# Patient Record
Sex: MALE | Race: ASIAN | NOT HISPANIC OR LATINO
[De-identification: names, ages, dates, MRNs, and addresses within clinical notes are randomized per-mention and may not be internally consistent; named-entity substitution may affect disease eponyms.]

---

## 2017-03-17 ENCOUNTER — APPOINTMENT (OUTPATIENT)
Dept: INTERNAL MEDICINE | Facility: CLINIC | Age: 32
End: 2017-03-17

## 2017-03-17 VITALS
WEIGHT: 202 LBS | SYSTOLIC BLOOD PRESSURE: 122 MMHG | HEIGHT: 70 IN | HEART RATE: 88 BPM | BODY MASS INDEX: 28.92 KG/M2 | TEMPERATURE: 98 F | OXYGEN SATURATION: 98 % | DIASTOLIC BLOOD PRESSURE: 78 MMHG

## 2017-03-17 DIAGNOSIS — G89.29 PAIN IN LEFT ANKLE AND JOINTS OF LEFT FOOT: ICD-10-CM

## 2017-03-17 DIAGNOSIS — Z83.49 FAMILY HISTORY OF OTHER ENDOCRINE, NUTRITIONAL AND METABOLIC DISEASES: ICD-10-CM

## 2017-03-17 DIAGNOSIS — Z82.49 FAMILY HISTORY OF ISCHEMIC HEART DISEASE AND OTHER DISEASES OF THE CIRCULATORY SYSTEM: ICD-10-CM

## 2017-03-17 DIAGNOSIS — M25.572 PAIN IN LEFT ANKLE AND JOINTS OF LEFT FOOT: ICD-10-CM

## 2017-03-20 DIAGNOSIS — E55.9 VITAMIN D DEFICIENCY, UNSPECIFIED: ICD-10-CM

## 2017-03-20 LAB
25(OH)D3 SERPL-MCNC: 9.9 NG/ML
ALBUMIN SERPL ELPH-MCNC: 4.7 G/DL
ALP BLD-CCNC: 70 U/L
ALT SERPL-CCNC: 38 U/L
ANION GAP SERPL CALC-SCNC: 16 MMOL/L
AST SERPL-CCNC: 23 U/L
BILIRUB SERPL-MCNC: 1.1 MG/DL
BUN SERPL-MCNC: 13 MG/DL
CALCIUM SERPL-MCNC: 9.8 MG/DL
CHLORIDE SERPL-SCNC: 102 MMOL/L
CHOLEST SERPL-MCNC: 199 MG/DL
CHOLEST/HDLC SERPL: 4.2 RATIO
CO2 SERPL-SCNC: 22 MMOL/L
CREAT SERPL-MCNC: 0.89 MG/DL
GLUCOSE SERPL-MCNC: 99 MG/DL
HBA1C MFR BLD HPLC: 5.5 %
HDLC SERPL-MCNC: 47 MG/DL
HIV1+2 AB SPEC QL IA.RAPID: NONREACTIVE
LDLC SERPL CALC-MCNC: 113 MG/DL
POTASSIUM SERPL-SCNC: 4.4 MMOL/L
PROT SERPL-MCNC: 7.9 G/DL
SODIUM SERPL-SCNC: 140 MMOL/L
TRIGL SERPL-MCNC: 193 MG/DL
TSH SERPL-ACNC: 0.77 UIU/ML

## 2017-09-19 ENCOUNTER — APPOINTMENT (OUTPATIENT)
Dept: INTERNAL MEDICINE | Facility: CLINIC | Age: 32
End: 2017-09-19
Payer: COMMERCIAL

## 2017-09-19 VITALS
HEART RATE: 88 BPM | TEMPERATURE: 98.3 F | BODY MASS INDEX: 28.06 KG/M2 | DIASTOLIC BLOOD PRESSURE: 74 MMHG | SYSTOLIC BLOOD PRESSURE: 120 MMHG | OXYGEN SATURATION: 98 % | WEIGHT: 196 LBS | HEIGHT: 70 IN

## 2017-09-19 PROCEDURE — 99213 OFFICE O/P EST LOW 20 MIN: CPT | Mod: 25

## 2017-09-19 PROCEDURE — 93000 ELECTROCARDIOGRAM COMPLETE: CPT

## 2017-09-20 LAB
25(OH)D3 SERPL-MCNC: 15.3 NG/ML
ALBUMIN SERPL ELPH-MCNC: 4.6 G/DL
ALP BLD-CCNC: 66 U/L
ALT SERPL-CCNC: 15 U/L
ANION GAP SERPL CALC-SCNC: 15 MMOL/L
AST SERPL-CCNC: 17 U/L
BASOPHILS # BLD AUTO: 0.03 K/UL
BASOPHILS NFR BLD AUTO: 0.5 %
BILIRUB SERPL-MCNC: 0.9 MG/DL
BUN SERPL-MCNC: 17 MG/DL
CALCIUM SERPL-MCNC: 9.3 MG/DL
CHLORIDE SERPL-SCNC: 102 MMOL/L
CO2 SERPL-SCNC: 22 MMOL/L
CREAT SERPL-MCNC: 1.43 MG/DL
EOSINOPHIL # BLD AUTO: 0.11 K/UL
EOSINOPHIL NFR BLD AUTO: 1.9 %
GLUCOSE SERPL-MCNC: 93 MG/DL
HCT VFR BLD CALC: 41.4 %
HGB BLD-MCNC: 13.9 G/DL
IMM GRANULOCYTES NFR BLD AUTO: 0.3 %
LYMPHOCYTES # BLD AUTO: 1.57 K/UL
LYMPHOCYTES NFR BLD AUTO: 26.9 %
MAN DIFF?: NORMAL
MCHC RBC-ENTMCNC: 27.5 PG
MCHC RBC-ENTMCNC: 33.6 GM/DL
MCV RBC AUTO: 82 FL
MONOCYTES # BLD AUTO: 0.39 K/UL
MONOCYTES NFR BLD AUTO: 6.7 %
NEUTROPHILS # BLD AUTO: 3.72 K/UL
NEUTROPHILS NFR BLD AUTO: 63.7 %
PLATELET # BLD AUTO: 240 K/UL
POTASSIUM SERPL-SCNC: 4.2 MMOL/L
PROT SERPL-MCNC: 7.4 G/DL
RBC # BLD: 5.05 M/UL
RBC # FLD: 13.1 %
SODIUM SERPL-SCNC: 139 MMOL/L
TSH SERPL-ACNC: 0.85 UIU/ML
WBC # FLD AUTO: 5.84 K/UL

## 2017-12-29 ENCOUNTER — CLINICAL ADVICE (OUTPATIENT)
Age: 32
End: 2017-12-29

## 2018-07-10 ENCOUNTER — APPOINTMENT (OUTPATIENT)
Dept: DERMATOLOGY | Facility: CLINIC | Age: 33
End: 2018-07-10
Payer: COMMERCIAL

## 2018-07-10 VITALS
WEIGHT: 190 LBS | BODY MASS INDEX: 26.6 KG/M2 | SYSTOLIC BLOOD PRESSURE: 122 MMHG | HEIGHT: 71 IN | DIASTOLIC BLOOD PRESSURE: 70 MMHG

## 2018-07-10 PROCEDURE — 99243 OFF/OP CNSLTJ NEW/EST LOW 30: CPT

## 2018-07-10 RX ORDER — CLOBETASOL PROPIONATE 0.5 MG/G
0.05 OINTMENT TOPICAL
Qty: 1 | Refills: 1 | Status: ACTIVE | COMMUNITY
Start: 2018-07-10 | End: 1900-01-01

## 2018-07-16 ENCOUNTER — APPOINTMENT (OUTPATIENT)
Dept: ORTHOPEDIC SURGERY | Facility: CLINIC | Age: 33
End: 2018-07-16

## 2018-07-31 ENCOUNTER — APPOINTMENT (OUTPATIENT)
Dept: CV DIAGNOSTICS | Facility: HOSPITAL | Age: 33
End: 2018-07-31

## 2018-08-10 ENCOUNTER — APPOINTMENT (OUTPATIENT)
Dept: DERMATOLOGY | Facility: CLINIC | Age: 33
End: 2018-08-10

## 2018-09-14 ENCOUNTER — APPOINTMENT (OUTPATIENT)
Dept: INTERNAL MEDICINE | Facility: CLINIC | Age: 33
End: 2018-09-14
Payer: COMMERCIAL

## 2018-09-14 VITALS
HEART RATE: 104 BPM | SYSTOLIC BLOOD PRESSURE: 110 MMHG | BODY MASS INDEX: 28.67 KG/M2 | HEIGHT: 69.69 IN | DIASTOLIC BLOOD PRESSURE: 60 MMHG | OXYGEN SATURATION: 98 % | TEMPERATURE: 99 F | WEIGHT: 198 LBS

## 2018-09-14 DIAGNOSIS — R94.31 ABNORMAL ELECTROCARDIOGRAM [ECG] [EKG]: ICD-10-CM

## 2018-09-14 PROCEDURE — 36415 COLL VENOUS BLD VENIPUNCTURE: CPT

## 2018-09-14 PROCEDURE — 99395 PREV VISIT EST AGE 18-39: CPT | Mod: 25

## 2018-09-14 NOTE — ASSESSMENT
[FreeTextEntry1] : 32yo M with pmh of anxiety now resolved - here for cpe.\par \par Hx of abn EKG - previously referred to cardiology - planning to go after probation time from work end\par \par HCM: cpe today\par          nonfasting labs\par          declined flu - can return to see nurse

## 2018-09-14 NOTE — HISTORY OF PRESENT ILLNESS
[FreeTextEntry1] : cpe [de-identified] : 34yo M here for cpe.\par PMH: anxiety\par Surgery: none\par Allergies: none\par Meds: Reviewed. xanax dc'ed -no longer needed\par \par ROS: neg. Remainder of ROS reviewed and found to be negative.\par \par PHYSICAL\par Gen: Adult, NAD\par Head: NC/AT\par EENT: ears grossly normal, PERRL, EOMI moist mucosa\par Neck: supple\par Chest wall: nontender\par CV: normal s1 +s2, rrr, no murmurs\par Pulm: CTA-B\par Abd: soft, NT, ND\par Skin: no rashes\par Back: no CVA tenderness, no spinal tenderness\par Neuro: gait normal, AAOx3\par Psych: normal affect, normal interaction

## 2018-09-25 LAB
25(OH)D3 SERPL-MCNC: 18.2 NG/ML
ALBUMIN SERPL ELPH-MCNC: 4.7 G/DL
ALP BLD-CCNC: 65 U/L
ALT SERPL-CCNC: 24 U/L
ANION GAP SERPL CALC-SCNC: 16 MMOL/L
AST SERPL-CCNC: 19 U/L
BASOPHILS # BLD AUTO: 0.03 K/UL
BASOPHILS NFR BLD AUTO: 0.5 %
BILIRUB SERPL-MCNC: 0.9 MG/DL
BUN SERPL-MCNC: 13 MG/DL
CALCIUM SERPL-MCNC: 9.7 MG/DL
CHLORIDE SERPL-SCNC: 107 MMOL/L
CHOLEST SERPL-MCNC: 184 MG/DL
CHOLEST/HDLC SERPL: 4.4 RATIO
CO2 SERPL-SCNC: 20 MMOL/L
CREAT SERPL-MCNC: 1.08 MG/DL
DEPRECATED D DIMER PPP IA-ACNC: <150 NG/ML DDU
EOSINOPHIL # BLD AUTO: 0.1 K/UL
EOSINOPHIL NFR BLD AUTO: 1.6 %
GLUCOSE SERPL-MCNC: 105 MG/DL
HBA1C MFR BLD HPLC: 5.4 %
HCT VFR BLD CALC: 43.5 %
HDLC SERPL-MCNC: 42 MG/DL
HGB BLD-MCNC: 14.9 G/DL
IMM GRANULOCYTES NFR BLD AUTO: 0.5 %
LDLC SERPL CALC-MCNC: 99 MG/DL
LYMPHOCYTES # BLD AUTO: 1.39 K/UL
LYMPHOCYTES NFR BLD AUTO: 22.2 %
MAN DIFF?: NORMAL
MCHC RBC-ENTMCNC: 28 PG
MCHC RBC-ENTMCNC: 34.3 GM/DL
MCV RBC AUTO: 81.8 FL
MONOCYTES # BLD AUTO: 0.29 K/UL
MONOCYTES NFR BLD AUTO: 4.6 %
NEUTROPHILS # BLD AUTO: 4.42 K/UL
NEUTROPHILS NFR BLD AUTO: 70.6 %
PLATELET # BLD AUTO: 261 K/UL
POTASSIUM SERPL-SCNC: 4.5 MMOL/L
PROT SERPL-MCNC: 7.7 G/DL
RBC # BLD: 5.32 M/UL
RBC # FLD: 13 %
SODIUM SERPL-SCNC: 143 MMOL/L
TRIGL SERPL-MCNC: 214 MG/DL
TSH SERPL-ACNC: 1.1 UIU/ML
WBC # FLD AUTO: 6.26 K/UL

## 2018-11-30 ENCOUNTER — MEDICATION RENEWAL (OUTPATIENT)
Age: 33
End: 2018-11-30

## 2019-03-11 ENCOUNTER — MEDICATION RENEWAL (OUTPATIENT)
Age: 34
End: 2019-03-11

## 2019-03-25 ENCOUNTER — MEDICATION RENEWAL (OUTPATIENT)
Age: 34
End: 2019-03-25

## 2019-05-17 ENCOUNTER — MEDICATION RENEWAL (OUTPATIENT)
Age: 34
End: 2019-05-17

## 2019-07-29 ENCOUNTER — MEDICATION RENEWAL (OUTPATIENT)
Age: 34
End: 2019-07-29

## 2019-10-14 ENCOUNTER — MEDICATION RENEWAL (OUTPATIENT)
Age: 34
End: 2019-10-14

## 2019-12-06 ENCOUNTER — APPOINTMENT (OUTPATIENT)
Dept: INTERNAL MEDICINE | Facility: CLINIC | Age: 34
End: 2019-12-06
Payer: COMMERCIAL

## 2019-12-06 VITALS
OXYGEN SATURATION: 98 % | HEIGHT: 69 IN | BODY MASS INDEX: 28.58 KG/M2 | WEIGHT: 193 LBS | TEMPERATURE: 97.8 F | DIASTOLIC BLOOD PRESSURE: 80 MMHG | SYSTOLIC BLOOD PRESSURE: 130 MMHG | HEART RATE: 88 BPM

## 2019-12-06 PROCEDURE — 99395 PREV VISIT EST AGE 18-39: CPT | Mod: 25

## 2019-12-06 PROCEDURE — 36415 COLL VENOUS BLD VENIPUNCTURE: CPT

## 2019-12-06 RX ORDER — KETOCONAZOLE 20.5 MG/ML
2 SHAMPOO, SUSPENSION TOPICAL
Qty: 1 | Refills: 2 | Status: ACTIVE | COMMUNITY
Start: 2017-03-17 | End: 1900-01-01

## 2019-12-09 LAB
25(OH)D3 SERPL-MCNC: 18.4 NG/ML
ALBUMIN SERPL ELPH-MCNC: 4.9 G/DL
ALP BLD-CCNC: 64 U/L
ALT SERPL-CCNC: 22 U/L
ANION GAP SERPL CALC-SCNC: 15 MMOL/L
AST SERPL-CCNC: 16 U/L
BASOPHILS # BLD AUTO: 0.05 K/UL
BASOPHILS NFR BLD AUTO: 0.8 %
BILIRUB SERPL-MCNC: 1.2 MG/DL
BUN SERPL-MCNC: 24 MG/DL
C TRACH RRNA SPEC QL NAA+PROBE: NOT DETECTED
CALCIUM SERPL-MCNC: 9.3 MG/DL
CHLORIDE SERPL-SCNC: 106 MMOL/L
CHOLEST SERPL-MCNC: 182 MG/DL
CHOLEST/HDLC SERPL: 3.8 RATIO
CO2 SERPL-SCNC: 20 MMOL/L
CREAT SERPL-MCNC: 1.1 MG/DL
EOSINOPHIL # BLD AUTO: 0.07 K/UL
EOSINOPHIL NFR BLD AUTO: 1.1 %
ESTIMATED AVERAGE GLUCOSE: 108 MG/DL
GLUCOSE SERPL-MCNC: 96 MG/DL
HBA1C MFR BLD HPLC: 5.4 %
HCT VFR BLD CALC: 45.8 %
HDLC SERPL-MCNC: 48 MG/DL
HGB BLD-MCNC: 15.2 G/DL
HIV1+2 AB SPEC QL IA.RAPID: NONREACTIVE
IMM GRANULOCYTES NFR BLD AUTO: 0.3 %
LDLC SERPL CALC-MCNC: 120 MG/DL
LYMPHOCYTES # BLD AUTO: 1.58 K/UL
LYMPHOCYTES NFR BLD AUTO: 24.7 %
MAN DIFF?: NORMAL
MCHC RBC-ENTMCNC: 28 PG
MCHC RBC-ENTMCNC: 33.2 GM/DL
MCV RBC AUTO: 84.5 FL
MONOCYTES # BLD AUTO: 0.5 K/UL
MONOCYTES NFR BLD AUTO: 7.8 %
N GONORRHOEA RRNA SPEC QL NAA+PROBE: NOT DETECTED
NEUTROPHILS # BLD AUTO: 4.18 K/UL
NEUTROPHILS NFR BLD AUTO: 65.3 %
PLATELET # BLD AUTO: 217 K/UL
POTASSIUM SERPL-SCNC: 4.3 MMOL/L
PROT SERPL-MCNC: 7.2 G/DL
RBC # BLD: 5.42 M/UL
RBC # FLD: 12.6 %
SODIUM SERPL-SCNC: 141 MMOL/L
SOURCE AMPLIFICATION: NORMAL
T PALLIDUM AB SER QL IA: NEGATIVE
TRIGL SERPL-MCNC: 68 MG/DL
TSH SERPL-ACNC: 0.92 UIU/ML
WBC # FLD AUTO: 6.4 K/UL

## 2019-12-09 NOTE — ASSESSMENT
[FreeTextEntry1] : 35yo M with psoriasis here for cpe\par \par psoriasis - sees derm, needs refill of shampoo\par \par hcm - defer flu to another time\par            labs today\par             has a  skin doctor\par              sees dentist\par               std check

## 2019-12-09 NOTE — HISTORY OF PRESENT ILLNESS
[FreeTextEntry1] : cpe [de-identified] : 33yo M here for cpe\par no ER but went to  for a fever while in [place] - \par dx with flu - took abx\par on-off cough \par no allergies - seasonal \par occasional sinus \par \par going through a divorce\par stressor at work - looking to change\par does weights - goes to gym 3 times per week\par \par Remainder of ROS reviewed and found to be negative.\par

## 2021-06-30 ENCOUNTER — APPOINTMENT (OUTPATIENT)
Dept: DERMATOLOGY | Facility: CLINIC | Age: 36
End: 2021-06-30
Payer: COMMERCIAL

## 2021-06-30 VITALS — HEIGHT: 72 IN | BODY MASS INDEX: 26.41 KG/M2 | WEIGHT: 195 LBS

## 2021-06-30 DIAGNOSIS — L21.9 SEBORRHEIC DERMATITIS, UNSPECIFIED: ICD-10-CM

## 2021-06-30 DIAGNOSIS — L81.8 OTHER SPECIFIED DISORDERS OF PIGMENTATION: ICD-10-CM

## 2021-06-30 PROCEDURE — 99072 ADDL SUPL MATRL&STAF TM PHE: CPT

## 2021-06-30 PROCEDURE — 99214 OFFICE O/P EST MOD 30 MIN: CPT

## 2021-06-30 RX ORDER — HYDROCORTISONE 25 MG/G
2.5 CREAM TOPICAL
Qty: 1 | Refills: 2 | Status: ACTIVE | COMMUNITY
Start: 2018-07-10 | End: 1900-01-01

## 2021-06-30 RX ORDER — CICLOPIROX 10 MG/.96ML
1 SHAMPOO TOPICAL
Qty: 1 | Refills: 11 | Status: ACTIVE | COMMUNITY
Start: 2021-06-30 | End: 1900-01-01

## 2021-07-01 PROBLEM — L21.9 SEBORRHEIC DERMATITIS: Status: ACTIVE | Noted: 2018-07-10

## 2021-07-01 PROBLEM — L81.8 POSTINFLAMMATORY HYPOPIGMENTATION: Status: ACTIVE | Noted: 2021-07-01

## 2021-07-08 ENCOUNTER — TRANSCRIPTION ENCOUNTER (OUTPATIENT)
Age: 36
End: 2021-07-08

## 2021-07-30 ENCOUNTER — APPOINTMENT (OUTPATIENT)
Dept: INTERNAL MEDICINE | Facility: CLINIC | Age: 36
End: 2021-07-30
Payer: COMMERCIAL

## 2021-07-30 VITALS
DIASTOLIC BLOOD PRESSURE: 68 MMHG | OXYGEN SATURATION: 98 % | TEMPERATURE: 98 F | WEIGHT: 194 LBS | HEART RATE: 89 BPM | BODY MASS INDEX: 26.31 KG/M2 | SYSTOLIC BLOOD PRESSURE: 110 MMHG

## 2021-07-30 DIAGNOSIS — Z00.00 ENCOUNTER FOR GENERAL ADULT MEDICAL EXAMINATION W/OUT ABNORMAL FINDINGS: ICD-10-CM

## 2021-07-30 DIAGNOSIS — Z86.59 PERSONAL HISTORY OF OTHER MENTAL AND BEHAVIORAL DISORDERS: ICD-10-CM

## 2021-07-30 DIAGNOSIS — Z87.898 PERSONAL HISTORY OF OTHER SPECIFIED CONDITIONS: ICD-10-CM

## 2021-07-30 DIAGNOSIS — L30.1 DYSHIDROSIS [POMPHOLYX]: ICD-10-CM

## 2021-07-30 DIAGNOSIS — R79.89 OTHER SPECIFIED ABNORMAL FINDINGS OF BLOOD CHEMISTRY: ICD-10-CM

## 2021-07-30 PROCEDURE — 99395 PREV VISIT EST AGE 18-39: CPT

## 2021-07-30 RX ORDER — ALPRAZOLAM 0.25 MG/1
0.25 TABLET ORAL
Qty: 15 | Refills: 0 | Status: DISCONTINUED | COMMUNITY
Start: 2017-09-19 | End: 2021-07-30

## 2021-07-30 NOTE — HEALTH RISK ASSESSMENT
[Good] : ~his/her~  mood as  good [No] : In the past 12 months have you used drugs other than those required for medical reasons? No [0] : 2) Feeling down, depressed, or hopeless: Not at all (0) [PHQ-2 Negative - No further assessment needed] : PHQ-2 Negative - No further assessment needed [Alone] : lives alone [Employed] : employed [Smoke Detector] : smoke detector [Carbon Monoxide Detector] : carbon monoxide detector [] : No [de-identified] : about twice a week - weights and basketball  [[id number]] : 0 [Sexually Active] : not sexually active [Reports changes in hearing] : Reports no changes in hearing [Reports changes in vision] : Reports no changes in vision [Reports changes in dental health] : Reports no changes in dental health [Guns at Home] : no guns at home

## 2021-07-30 NOTE — ASSESSMENT
[FreeTextEntry1] : HM\par UTD dental\par referral for skin screening\par tdap advised, would like to wait until next visit \par check labs\par Recieved COVID vaccine \par

## 2021-07-30 NOTE — PHYSICAL EXAM
[No Carotid Bruits] : no carotid bruits [No Edema] : there was no peripheral edema [Coordination Grossly Intact] : coordination grossly intact [No Focal Deficits] : no focal deficits [Normal Gait] : normal gait [Normal] : affect was normal and insight and judgment were intact

## 2021-07-30 NOTE — HISTORY OF PRESENT ILLNESS
[FreeTextEntry1] : CPE/wellness visit  [de-identified] : 36 y.o. male, PMHx panic attacks, palpitations, eczema, elevated creatinine.  \par \par Feeling well, no concerns offered.  Getting  next month, just bought a house.  \par \par No issue with panic attacks, anxiety, palpitations in several years.  All work related, has a new job.  \par \par Regular follow up with derm for eczema.  \par \par H/o elevated creatinine,  repeats have been normal.

## 2021-08-02 LAB
25(OH)D3 SERPL-MCNC: 17.6 NG/ML
ALBUMIN SERPL ELPH-MCNC: 4.8 G/DL
ALP BLD-CCNC: 67 U/L
ALT SERPL-CCNC: 20 U/L
ANION GAP SERPL CALC-SCNC: 12 MMOL/L
AST SERPL-CCNC: 13 U/L
BASOPHILS # BLD AUTO: 0.04 K/UL
BASOPHILS NFR BLD AUTO: 0.8 %
BILIRUB SERPL-MCNC: 1.2 MG/DL
BUN SERPL-MCNC: 13 MG/DL
C TRACH RRNA SPEC QL NAA+PROBE: NOT DETECTED
CALCIUM SERPL-MCNC: 9.5 MG/DL
CHLORIDE SERPL-SCNC: 104 MMOL/L
CHOLEST SERPL-MCNC: 189 MG/DL
CO2 SERPL-SCNC: 22 MMOL/L
CREAT SERPL-MCNC: 0.94 MG/DL
EOSINOPHIL # BLD AUTO: 0.11 K/UL
EOSINOPHIL NFR BLD AUTO: 2.2 %
ESTIMATED AVERAGE GLUCOSE: 108 MG/DL
FOLATE SERPL-MCNC: 18.1 NG/ML
GLUCOSE SERPL-MCNC: 101 MG/DL
HBA1C MFR BLD HPLC: 5.4 %
HCT VFR BLD CALC: 44.2 %
HCV AB SER QL: NONREACTIVE
HCV S/CO RATIO: 0.14 S/CO
HDLC SERPL-MCNC: 42 MG/DL
HGB BLD-MCNC: 14.9 G/DL
HIV1+2 AB SPEC QL IA.RAPID: NONREACTIVE
IMM GRANULOCYTES NFR BLD AUTO: 0.4 %
LDLC SERPL CALC-MCNC: 116 MG/DL
LYMPHOCYTES # BLD AUTO: 1.4 K/UL
LYMPHOCYTES NFR BLD AUTO: 28.6 %
MAN DIFF?: NORMAL
MCHC RBC-ENTMCNC: 28 PG
MCHC RBC-ENTMCNC: 33.7 GM/DL
MCV RBC AUTO: 82.9 FL
MONOCYTES # BLD AUTO: 0.32 K/UL
MONOCYTES NFR BLD AUTO: 6.5 %
N GONORRHOEA RRNA SPEC QL NAA+PROBE: NOT DETECTED
NEUTROPHILS # BLD AUTO: 3.01 K/UL
NEUTROPHILS NFR BLD AUTO: 61.5 %
NONHDLC SERPL-MCNC: 147 MG/DL
PLATELET # BLD AUTO: 243 K/UL
POTASSIUM SERPL-SCNC: 3.9 MMOL/L
PROT SERPL-MCNC: 7.4 G/DL
RBC # BLD: 5.33 M/UL
RBC # FLD: 12.6 %
SODIUM SERPL-SCNC: 138 MMOL/L
SOURCE AMPLIFICATION: NORMAL
T PALLIDUM AB SER QL IA: NEGATIVE
TRIGL SERPL-MCNC: 154 MG/DL
TSH SERPL-ACNC: 1.09 UIU/ML
VIT B12 SERPL-MCNC: 542 PG/ML
WBC # FLD AUTO: 4.9 K/UL

## 2021-08-02 RX ORDER — ERGOCALCIFEROL 1.25 MG/1
1.25 MG CAPSULE, LIQUID FILLED ORAL
Qty: 12 | Refills: 0 | Status: ACTIVE | COMMUNITY
Start: 2017-03-20 | End: 1900-01-01

## 2022-11-21 NOTE — PHYSICAL EXAM
Detail Level: Detailed [de-identified] : Gen: Adult M, NAD\par Head: NC/AT\par EENT: ears grossly normal, PERRL, EOMI, moist mucosa\par Neck: supple\par Chest wall: nontender\par CV: normal s1 +s2, rrr, no murmurs\par Pulm: CTA-B\par Abd: soft, NT, ND\par Skin: no rashes\par Back: no CVA tenderness, no spinal tenderness\par Neuro: gait normal, AAOx3\par Psych: normal affect, normal interaction\par  [de-identified] : peeling skin in ear canal; erythema on lower face c/w psoriasis

## 2023-11-17 ENCOUNTER — INPATIENT (INPATIENT)
Facility: HOSPITAL | Age: 38
LOS: 4 days | Discharge: HOME | End: 2023-11-22
Attending: STUDENT IN AN ORGANIZED HEALTH CARE EDUCATION/TRAINING PROGRAM | Admitting: STUDENT IN AN ORGANIZED HEALTH CARE EDUCATION/TRAINING PROGRAM
Payer: COMMERCIAL

## 2023-11-17 VITALS
TEMPERATURE: 99 F | RESPIRATION RATE: 18 BRPM | OXYGEN SATURATION: 98 % | SYSTOLIC BLOOD PRESSURE: 128 MMHG | DIASTOLIC BLOOD PRESSURE: 84 MMHG | HEART RATE: 85 BPM

## 2023-11-17 PROCEDURE — 99285 EMERGENCY DEPT VISIT HI MDM: CPT

## 2023-11-17 NOTE — ED ADULT TRIAGE NOTE - CHIEF COMPLAINT QUOTE
Pt st" I been having discomfort like acid reflux...went to my Gi md did blood work told to go to ER for further eval may have a CBD blockage...have not been able to sleep for past 5 days. ." Pt st" I been having discomfort like acid reflux...went to my Gi md did blood work told to go to ER for further eval may have a CBD blockage...have not been able to sleep for past 5 days. ." pt localizing pain to epigastric area

## 2023-11-18 ENCOUNTER — TRANSCRIPTION ENCOUNTER (OUTPATIENT)
Age: 38
End: 2023-11-18

## 2023-11-18 DIAGNOSIS — K85.10 BILIARY ACUTE PANCREATITIS WITHOUT NECROSIS OR INFECTION: ICD-10-CM

## 2023-11-18 LAB
ALBUMIN SERPL ELPH-MCNC: 4.4 G/DL — SIGNIFICANT CHANGE UP (ref 3.3–5)
ALBUMIN SERPL ELPH-MCNC: 4.4 G/DL — SIGNIFICANT CHANGE UP (ref 3.3–5)
ALP SERPL-CCNC: 206 U/L — HIGH (ref 40–120)
ALP SERPL-CCNC: 206 U/L — HIGH (ref 40–120)
ALT FLD-CCNC: 754 U/L — HIGH (ref 4–41)
ALT FLD-CCNC: 754 U/L — HIGH (ref 4–41)
ANION GAP SERPL CALC-SCNC: 11 MMOL/L — SIGNIFICANT CHANGE UP (ref 7–14)
ANION GAP SERPL CALC-SCNC: 11 MMOL/L — SIGNIFICANT CHANGE UP (ref 7–14)
APTT BLD: 30.7 SEC — SIGNIFICANT CHANGE UP (ref 24.5–35.6)
APTT BLD: 30.7 SEC — SIGNIFICANT CHANGE UP (ref 24.5–35.6)
AST SERPL-CCNC: 228 U/L — HIGH (ref 4–40)
AST SERPL-CCNC: 228 U/L — HIGH (ref 4–40)
BASOPHILS # BLD AUTO: 0.04 K/UL — SIGNIFICANT CHANGE UP (ref 0–0.2)
BASOPHILS # BLD AUTO: 0.04 K/UL — SIGNIFICANT CHANGE UP (ref 0–0.2)
BASOPHILS NFR BLD AUTO: 0.5 % — SIGNIFICANT CHANGE UP (ref 0–2)
BASOPHILS NFR BLD AUTO: 0.5 % — SIGNIFICANT CHANGE UP (ref 0–2)
BILIRUB SERPL-MCNC: 7.4 MG/DL — HIGH (ref 0.2–1.2)
BILIRUB SERPL-MCNC: 7.4 MG/DL — HIGH (ref 0.2–1.2)
BLD GP AB SCN SERPL QL: NEGATIVE — SIGNIFICANT CHANGE UP
BLD GP AB SCN SERPL QL: NEGATIVE — SIGNIFICANT CHANGE UP
BUN SERPL-MCNC: 8 MG/DL — SIGNIFICANT CHANGE UP (ref 7–23)
BUN SERPL-MCNC: 8 MG/DL — SIGNIFICANT CHANGE UP (ref 7–23)
CALCIUM SERPL-MCNC: 8.9 MG/DL — SIGNIFICANT CHANGE UP (ref 8.4–10.5)
CALCIUM SERPL-MCNC: 8.9 MG/DL — SIGNIFICANT CHANGE UP (ref 8.4–10.5)
CHLORIDE SERPL-SCNC: 102 MMOL/L — SIGNIFICANT CHANGE UP (ref 98–107)
CHLORIDE SERPL-SCNC: 102 MMOL/L — SIGNIFICANT CHANGE UP (ref 98–107)
CO2 SERPL-SCNC: 24 MMOL/L — SIGNIFICANT CHANGE UP (ref 22–31)
CO2 SERPL-SCNC: 24 MMOL/L — SIGNIFICANT CHANGE UP (ref 22–31)
CREAT SERPL-MCNC: 0.98 MG/DL — SIGNIFICANT CHANGE UP (ref 0.5–1.3)
CREAT SERPL-MCNC: 0.98 MG/DL — SIGNIFICANT CHANGE UP (ref 0.5–1.3)
EGFR: 100 ML/MIN/1.73M2 — SIGNIFICANT CHANGE UP
EGFR: 100 ML/MIN/1.73M2 — SIGNIFICANT CHANGE UP
EOSINOPHIL # BLD AUTO: 0.05 K/UL — SIGNIFICANT CHANGE UP (ref 0–0.5)
EOSINOPHIL # BLD AUTO: 0.05 K/UL — SIGNIFICANT CHANGE UP (ref 0–0.5)
EOSINOPHIL NFR BLD AUTO: 0.6 % — SIGNIFICANT CHANGE UP (ref 0–6)
EOSINOPHIL NFR BLD AUTO: 0.6 % — SIGNIFICANT CHANGE UP (ref 0–6)
GLUCOSE SERPL-MCNC: 125 MG/DL — HIGH (ref 70–99)
GLUCOSE SERPL-MCNC: 125 MG/DL — HIGH (ref 70–99)
HCT VFR BLD CALC: 44.5 % — SIGNIFICANT CHANGE UP (ref 39–50)
HCT VFR BLD CALC: 44.5 % — SIGNIFICANT CHANGE UP (ref 39–50)
HGB BLD-MCNC: 15.1 G/DL — SIGNIFICANT CHANGE UP (ref 13–17)
HGB BLD-MCNC: 15.1 G/DL — SIGNIFICANT CHANGE UP (ref 13–17)
IANC: 6.59 K/UL — SIGNIFICANT CHANGE UP (ref 1.8–7.4)
IANC: 6.59 K/UL — SIGNIFICANT CHANGE UP (ref 1.8–7.4)
IMM GRANULOCYTES NFR BLD AUTO: 0.7 % — SIGNIFICANT CHANGE UP (ref 0–0.9)
IMM GRANULOCYTES NFR BLD AUTO: 0.7 % — SIGNIFICANT CHANGE UP (ref 0–0.9)
INR BLD: 1.01 RATIO — SIGNIFICANT CHANGE UP (ref 0.85–1.18)
INR BLD: 1.01 RATIO — SIGNIFICANT CHANGE UP (ref 0.85–1.18)
LIDOCAIN IGE QN: > 3000 U/L — SIGNIFICANT CHANGE UP (ref 7–60)
LIDOCAIN IGE QN: > 3000 U/L — SIGNIFICANT CHANGE UP (ref 7–60)
LYMPHOCYTES # BLD AUTO: 0.89 K/UL — LOW (ref 1–3.3)
LYMPHOCYTES # BLD AUTO: 0.89 K/UL — LOW (ref 1–3.3)
LYMPHOCYTES # BLD AUTO: 10.7 % — LOW (ref 13–44)
LYMPHOCYTES # BLD AUTO: 10.7 % — LOW (ref 13–44)
MAGNESIUM SERPL-MCNC: 2.4 MG/DL — SIGNIFICANT CHANGE UP (ref 1.6–2.6)
MAGNESIUM SERPL-MCNC: 2.4 MG/DL — SIGNIFICANT CHANGE UP (ref 1.6–2.6)
MCHC RBC-ENTMCNC: 28.1 PG — SIGNIFICANT CHANGE UP (ref 27–34)
MCHC RBC-ENTMCNC: 28.1 PG — SIGNIFICANT CHANGE UP (ref 27–34)
MCHC RBC-ENTMCNC: 33.9 GM/DL — SIGNIFICANT CHANGE UP (ref 32–36)
MCHC RBC-ENTMCNC: 33.9 GM/DL — SIGNIFICANT CHANGE UP (ref 32–36)
MCV RBC AUTO: 82.9 FL — SIGNIFICANT CHANGE UP (ref 80–100)
MCV RBC AUTO: 82.9 FL — SIGNIFICANT CHANGE UP (ref 80–100)
MONOCYTES # BLD AUTO: 0.67 K/UL — SIGNIFICANT CHANGE UP (ref 0–0.9)
MONOCYTES # BLD AUTO: 0.67 K/UL — SIGNIFICANT CHANGE UP (ref 0–0.9)
MONOCYTES NFR BLD AUTO: 8.1 % — SIGNIFICANT CHANGE UP (ref 2–14)
MONOCYTES NFR BLD AUTO: 8.1 % — SIGNIFICANT CHANGE UP (ref 2–14)
NEUTROPHILS # BLD AUTO: 6.59 K/UL — SIGNIFICANT CHANGE UP (ref 1.8–7.4)
NEUTROPHILS # BLD AUTO: 6.59 K/UL — SIGNIFICANT CHANGE UP (ref 1.8–7.4)
NEUTROPHILS NFR BLD AUTO: 79.4 % — HIGH (ref 43–77)
NEUTROPHILS NFR BLD AUTO: 79.4 % — HIGH (ref 43–77)
NRBC # BLD: 0 /100 WBCS — SIGNIFICANT CHANGE UP (ref 0–0)
NRBC # BLD: 0 /100 WBCS — SIGNIFICANT CHANGE UP (ref 0–0)
NRBC # FLD: 0 K/UL — SIGNIFICANT CHANGE UP (ref 0–0)
NRBC # FLD: 0 K/UL — SIGNIFICANT CHANGE UP (ref 0–0)
PLATELET # BLD AUTO: 268 K/UL — SIGNIFICANT CHANGE UP (ref 150–400)
PLATELET # BLD AUTO: 268 K/UL — SIGNIFICANT CHANGE UP (ref 150–400)
POTASSIUM SERPL-MCNC: 3.6 MMOL/L — SIGNIFICANT CHANGE UP (ref 3.5–5.3)
POTASSIUM SERPL-MCNC: 3.6 MMOL/L — SIGNIFICANT CHANGE UP (ref 3.5–5.3)
POTASSIUM SERPL-SCNC: 3.6 MMOL/L — SIGNIFICANT CHANGE UP (ref 3.5–5.3)
POTASSIUM SERPL-SCNC: 3.6 MMOL/L — SIGNIFICANT CHANGE UP (ref 3.5–5.3)
PROT SERPL-MCNC: 7.3 G/DL — SIGNIFICANT CHANGE UP (ref 6–8.3)
PROT SERPL-MCNC: 7.3 G/DL — SIGNIFICANT CHANGE UP (ref 6–8.3)
PROTHROM AB SERPL-ACNC: 11.3 SEC — SIGNIFICANT CHANGE UP (ref 9.5–13)
PROTHROM AB SERPL-ACNC: 11.3 SEC — SIGNIFICANT CHANGE UP (ref 9.5–13)
RBC # BLD: 5.37 M/UL — SIGNIFICANT CHANGE UP (ref 4.2–5.8)
RBC # BLD: 5.37 M/UL — SIGNIFICANT CHANGE UP (ref 4.2–5.8)
RBC # FLD: 13.2 % — SIGNIFICANT CHANGE UP (ref 10.3–14.5)
RBC # FLD: 13.2 % — SIGNIFICANT CHANGE UP (ref 10.3–14.5)
RH IG SCN BLD-IMP: POSITIVE — SIGNIFICANT CHANGE UP
RH IG SCN BLD-IMP: POSITIVE — SIGNIFICANT CHANGE UP
SODIUM SERPL-SCNC: 137 MMOL/L — SIGNIFICANT CHANGE UP (ref 135–145)
SODIUM SERPL-SCNC: 137 MMOL/L — SIGNIFICANT CHANGE UP (ref 135–145)
WBC # BLD: 8.3 K/UL — SIGNIFICANT CHANGE UP (ref 3.8–10.5)
WBC # BLD: 8.3 K/UL — SIGNIFICANT CHANGE UP (ref 3.8–10.5)
WBC # FLD AUTO: 8.3 K/UL — SIGNIFICANT CHANGE UP (ref 3.8–10.5)
WBC # FLD AUTO: 8.3 K/UL — SIGNIFICANT CHANGE UP (ref 3.8–10.5)

## 2023-11-18 PROCEDURE — 74177 CT ABD & PELVIS W/CONTRAST: CPT | Mod: 26,MA

## 2023-11-18 PROCEDURE — 99222 1ST HOSP IP/OBS MODERATE 55: CPT | Mod: 57,GC

## 2023-11-18 PROCEDURE — 76705 ECHO EXAM OF ABDOMEN: CPT | Mod: 26

## 2023-11-18 PROCEDURE — 99285 EMERGENCY DEPT VISIT HI MDM: CPT | Mod: 25

## 2023-11-18 RX ORDER — PIPERACILLIN AND TAZOBACTAM 4; .5 G/20ML; G/20ML
3.38 INJECTION, POWDER, LYOPHILIZED, FOR SOLUTION INTRAVENOUS EVERY 8 HOURS
Refills: 0 | Status: DISCONTINUED | OUTPATIENT
Start: 2023-11-19 | End: 2023-11-20

## 2023-11-18 RX ORDER — HYDROMORPHONE HYDROCHLORIDE 2 MG/ML
0.5 INJECTION INTRAMUSCULAR; INTRAVENOUS; SUBCUTANEOUS EVERY 4 HOURS
Refills: 0 | Status: DISCONTINUED | OUTPATIENT
Start: 2023-11-18 | End: 2023-11-20

## 2023-11-18 RX ORDER — ONDANSETRON 8 MG/1
4 TABLET, FILM COATED ORAL ONCE
Refills: 0 | Status: COMPLETED | OUTPATIENT
Start: 2023-11-18 | End: 2023-11-18

## 2023-11-18 RX ORDER — PIPERACILLIN AND TAZOBACTAM 4; .5 G/20ML; G/20ML
3.38 INJECTION, POWDER, LYOPHILIZED, FOR SOLUTION INTRAVENOUS ONCE
Refills: 0 | Status: COMPLETED | OUTPATIENT
Start: 2023-11-18 | End: 2023-11-18

## 2023-11-18 RX ORDER — ACETAMINOPHEN 500 MG
1000 TABLET ORAL ONCE
Refills: 0 | Status: COMPLETED | OUTPATIENT
Start: 2023-11-18 | End: 2023-11-18

## 2023-11-18 RX ORDER — SODIUM CHLORIDE 9 MG/ML
1000 INJECTION INTRAMUSCULAR; INTRAVENOUS; SUBCUTANEOUS ONCE
Refills: 0 | Status: COMPLETED | OUTPATIENT
Start: 2023-11-18 | End: 2023-11-18

## 2023-11-18 RX ORDER — SODIUM CHLORIDE 9 MG/ML
1000 INJECTION INTRAMUSCULAR; INTRAVENOUS; SUBCUTANEOUS
Refills: 0 | Status: DISCONTINUED | OUTPATIENT
Start: 2023-11-18 | End: 2023-11-18

## 2023-11-18 RX ORDER — SODIUM CHLORIDE 9 MG/ML
1000 INJECTION, SOLUTION INTRAVENOUS
Refills: 0 | Status: DISCONTINUED | OUTPATIENT
Start: 2023-11-18 | End: 2023-11-20

## 2023-11-18 RX ORDER — ACETAMINOPHEN 500 MG
1000 TABLET ORAL EVERY 6 HOURS
Refills: 0 | Status: COMPLETED | OUTPATIENT
Start: 2023-11-18 | End: 2023-11-19

## 2023-11-18 RX ORDER — ENOXAPARIN SODIUM 100 MG/ML
40 INJECTION SUBCUTANEOUS EVERY 24 HOURS
Refills: 0 | Status: DISCONTINUED | OUTPATIENT
Start: 2023-11-18 | End: 2023-11-22

## 2023-11-18 RX ORDER — HYDROMORPHONE HYDROCHLORIDE 2 MG/ML
0.2 INJECTION INTRAMUSCULAR; INTRAVENOUS; SUBCUTANEOUS EVERY 4 HOURS
Refills: 0 | Status: DISCONTINUED | OUTPATIENT
Start: 2023-11-18 | End: 2023-11-20

## 2023-11-18 RX ORDER — PIPERACILLIN AND TAZOBACTAM 4; .5 G/20ML; G/20ML
3.38 INJECTION, POWDER, LYOPHILIZED, FOR SOLUTION INTRAVENOUS ONCE
Refills: 0 | Status: COMPLETED | OUTPATIENT
Start: 2023-11-19 | End: 2023-11-19

## 2023-11-18 RX ADMIN — SODIUM CHLORIDE 100 MILLILITER(S): 9 INJECTION, SOLUTION INTRAVENOUS at 10:19

## 2023-11-18 RX ADMIN — PIPERACILLIN AND TAZOBACTAM 25 GRAM(S): 4; .5 INJECTION, POWDER, LYOPHILIZED, FOR SOLUTION INTRAVENOUS at 10:17

## 2023-11-18 RX ADMIN — ONDANSETRON 4 MILLIGRAM(S): 8 TABLET, FILM COATED ORAL at 01:09

## 2023-11-18 RX ADMIN — PIPERACILLIN AND TAZOBACTAM 200 GRAM(S): 4; .5 INJECTION, POWDER, LYOPHILIZED, FOR SOLUTION INTRAVENOUS at 07:39

## 2023-11-18 RX ADMIN — SODIUM CHLORIDE 100 MILLILITER(S): 9 INJECTION, SOLUTION INTRAVENOUS at 21:53

## 2023-11-18 RX ADMIN — PIPERACILLIN AND TAZOBACTAM 25 GRAM(S): 4; .5 INJECTION, POWDER, LYOPHILIZED, FOR SOLUTION INTRAVENOUS at 18:51

## 2023-11-18 RX ADMIN — Medication 1000 MILLIGRAM(S): at 12:39

## 2023-11-18 RX ADMIN — SODIUM CHLORIDE 1000 MILLILITER(S): 9 INJECTION INTRAMUSCULAR; INTRAVENOUS; SUBCUTANEOUS at 01:09

## 2023-11-18 RX ADMIN — Medication 400 MILLIGRAM(S): at 20:10

## 2023-11-18 RX ADMIN — Medication 400 MILLIGRAM(S): at 12:11

## 2023-11-18 RX ADMIN — ENOXAPARIN SODIUM 40 MILLIGRAM(S): 100 INJECTION SUBCUTANEOUS at 20:11

## 2023-11-18 RX ADMIN — SODIUM CHLORIDE 1000 MILLILITER(S): 9 INJECTION INTRAMUSCULAR; INTRAVENOUS; SUBCUTANEOUS at 02:57

## 2023-11-18 RX ADMIN — SODIUM CHLORIDE 150 MILLILITER(S): 9 INJECTION INTRAMUSCULAR; INTRAVENOUS; SUBCUTANEOUS at 06:52

## 2023-11-18 RX ADMIN — Medication 400 MILLIGRAM(S): at 06:52

## 2023-11-18 NOTE — H&P ADULT - HISTORY OF PRESENT ILLNESS
39 yo M w/ no significnat PMHx or PSHx of presents to the ED w/ 1 week of epigastric abdominal discomfort and reflux. Patient states was having N/V with PO intoleratance earlier this week however improving. Patient also notes llighter stools, dark urine, and yellow of the eyes over the last 2 days. Patient saw outpatient GI a few days ago and labs came back with elevated bilirubin and LFTs prompting visit to the ED.     In the ED, AVSS. Labs show no leukocytosis. T bili of 7.4, AST//755, lipase >3000. CT A/P shows c/f acute cholecystitis and gallstone pancreatitis with CBD 8mm. Surgery consulted for evaluation     Patient had EGD as teenager for reflux found to have "slight narrowing of the duodenum" required no intervnetion.    39 yo M w/ no significant PMHx or PSHx of presents to the ED w/ 1 week of epigastric abdominal discomfort and reflux. Patient states was having N/V with PO intolerance earlier this week however improving. Patient also notes lighter stools, dark urine, and yellow of the eyes over the last 2 days. Patient saw outpatient GI a few days ago and labs came back with elevated bilirubin and LFTs prompting visit to the ED.     In the ED, AVSS. Labs show no leukocytosis. T bili of 7.4, AST//755, lipase >3000. CT A/P shows c/f acute cholecystitis and gallstone pancreatitis with CBD 8mm. Surgery consulted for evaluation     Patient had EGD as teenager for reflux found to have "slight narrowing of the duodenum" required no intervention    37 yo M w/ no significant PMHx or PSHx of presents to the ED w/ 1 week of epigastric abdominal discomfort and reflux. Patient states was having N/V with PO intolerance earlier this week however improving. Patient also notes lighter stools, dark urine, and yellow of the eyes over the last 2 days. Patient saw outpatient GI a few days ago and labs came back with elevated bilirubin and LFTs prompting visit to the ED.     In the ED, AVSS. Labs show no leukocytosis. T bili of 7.4, AST//755, lipase >3000. CT A/P shows c/f acute cholecystitis and gallstone pancreatitis with CBD 8mm. RUQ c/f acute cholecystitis, measuring CBD 5mm. Surgery consulted for evaluation     Patient had EGD as teenager for reflux found to have "slight narrowing of the duodenum" required no intervention

## 2023-11-18 NOTE — ED ADULT NURSE NOTE - OBJECTIVE STATEMENT
38 y male with no past medical history c/o of sharp, intermitted gastric pain. Pt also stated that he thinks that he has clogged bile duct and he is becoming  jaundice. Pt stated that he is nauseous. presently denies any pain. 20 g IV placed right AC. labs obtained and sent to lab.

## 2023-11-18 NOTE — ED PROVIDER NOTE - OBJECTIVE STATEMENT
38-year-old male presents the ED at the advice of his doctor.  Patient has been having 1 week of epigastric abdominal discomfort and gassy sensation with early satiety.  Patient was also having nausea and vomiting earlier in the week but now currently nauseous.  Patient is also noted pale stools and over the last 2 days has been noted to have yellowing of the eyes and skin.  Patient was seen by GI 5 days ago and lab results came back today showing bili of 3 and elevated LFTs.  Patient has no history of gallstones or previous similar episodes.  Patient is no family history of any chronic conditions.  Patient was told of hyperlipidemia last year but has been working on diet and exercise.  Patient denies any fevers or chills or urinary complaints.  Patient has not had any or travel outside the country.

## 2023-11-18 NOTE — ED ADULT NURSE REASSESSMENT NOTE - NS ED NURSE REASSESS COMMENT FT1
hand off report given to CDU RN No signs of acute distress noted. Patient stable for transport Philippe GODOY

## 2023-11-18 NOTE — ED PROVIDER NOTE - CLINICAL SUMMARY MEDICAL DECISION MAKING FREE TEXT BOX
38-year-old male with jaundice scleral icterus and epigastric pain with nausea and occasional vomiting.  Patient appears to have an obstructive process causing elevated bilirubin.  Given patient's tenderness on exam this is most likely gallbladder related with possible choledocholithiasis.  Mass is less likely but also possible but given short duration of symptoms of 1 week mass is less likely.  Will check labs and ultrasound and give antiemetics and fluids.  Possible surgical consult versus medicine admission for MRCP.

## 2023-11-18 NOTE — ED PROVIDER NOTE - PROGRESS NOTE DETAILS
Patient CT scan shows gallstone pancreatitis and acute cholecystitis.  These findings were discussed with the surgical resident 5:15 AM.  They requested a right quadrant ultrasound by radiology.  This was performed and results were available at 630.  They confirm the finding of acute cholecystitis.  Surgery was read patient 630 and results discussed.  Awaiting full consultation.  Patient's pain has resolved and patient's nausea has also resolved.  We will continue maintenance fluids and keep patient n.p.o. Patient complained of neck pain after CT scan.  IV Ofirmev has been ordered.

## 2023-11-18 NOTE — H&P ADULT - ATTENDING COMMENTS
Patient with gallstone pancreatitis and biochemical evidence of choledocholithiasis.  plan  lap nikolas on this admission  npo, ivf  gi eval for ercp  mrcp    I have personally interviewed and examined this patient, reviewed pertinent labs and imaging, and discussed the case with colleagues, residents, and physician assistants on B Team rounds.    The active care issues are:  1. choledocholithiasis    The Acute Care Surgery (B Team) Attending Group Practice:  Dr. Bettie Michel, Dr. Gus Rogel, Dr. Tree Del Cid, Dr. Paul Kapadia,     urgent issues - spectra [id number]  nonurgent issues - [phone]  patient appointments or afterhours - [phone]

## 2023-11-18 NOTE — CONSULT NOTE ADULT - ASSESSMENT
37yo w/ no significant PMHx presenting w/ 1 week of abdominal pain, found to have acute cholecystitis w/ gallstone pancreatitis.    #Cholecystitis  #Gallstone pancreatitis  Etiology of pancreatitis most likely 2/2 gallstone given concurrent cholecystitis. No hx of drinking or recent medication use and w/o family or personal history of pancreatitis. Given clinical improvement suspect stone has likely passed (not visible on CT) though should be further evaluated with follow up imaging. Remains HD stable w/o c/f cholangitis.  Recommendations:  -Agree w/ zosyn and fluids for pancreatitis  -Please obtain MRCP to further evaluate bile ducts and +/- stone  -Trend liver enzymes; if downtrending and continues to improve clinically, presentation most likely suggests passed stone  -No need for acute endoscopic intervention at this time     Note incomplete until finalized by attending signature/attestation.    Rosa Santos  GI/Hepatology Fellow PGY5    NON-URGENT CONSULTS:  Please email [email or web address]  AT NIGHT AND ON WEEKENDS:  Available on [employer] Teams  [phone] (Long Range Pager)    After 5pm, please contact the on-call GI fellow. [phone]

## 2023-11-18 NOTE — H&P ADULT - NSHPPHYSICALEXAM_GEN_ALL_CORE
GEN: NAD, resting quietly. Scleral icterus   NEURO: AAOx3, no focal deficits  PULM: symmetric chest rise bilaterally, no increased WOB  CV: appears well perfused   ABD: Soft, nondistended, tender to deep palpation in epigastrium.  without rebound tenderness/guarding/rigidity  EXTR: no cyanosis or edema, moving all extremities

## 2023-11-18 NOTE — CONSULT NOTE ADULT - SUBJECTIVE AND OBJECTIVE BOX
INIITIAL GI CONSULTATION  HPI:  37 yo M w/ no significant PMHx or PSHx of presents to the ED w/ 1 week of epigastric abdominal discomfort and reflux. Patient states was having N/V with PO intolerance earlier this week however improving. Patient also notes lighter stools, dark urine, and yellow of the eyes over the last 2 days. Patient saw outpatient GI a few days ago and labs came back with elevated bilirubin and LFTs prompting visit to the ED.     In the ED, afebrile, HR 60-80s, /70-80s. Labs notable for TB 7.4, Alk phos 206, AST//754. WBC 8.30, INR 1.01. CT showed acute cholecystitis, gallstone pancreatitis, mildly dilated CBD w/ wall enhancement.   No identified radiopaque CBD stone.           In the ED, AVSS. Labs show no leukocytosis. T bili of 7.4, AST//755, lipase >3000. CT A/P shows c/f acute cholecystitis and gallstone pancreatitis with CBD 8mm. RUQ c/f acute cholecystitis, measuring CBD 5mm. Surgery consulted for evaluation     Patient had EGD as teenager for reflux found to have "slight narrowing of the duodenum" required no intervention    (2023 07:13)        ASA/NSAIDs/anticoagulation:     Labs/Imaging reviewed.                        15.1   8.30  )-----------( 268      ( 2023 01:12 )             44.5     Last Hb:Hemoglobin: 15.1 g/dL (23 @ 01:12)               137   |  102   |  8                  Ca: 8.9    BMP:   ----------------------------< 125    M.40  (23 @ 01:12)             3.6    |  24    | 0.98               Ph: x        LFT:     TPro: 7.3 / Alb: 4.4 / TBili: 7.4 / DBili: x / AST: 228 / ALT: 754 / AlkPhos: 206   (23 @ 01:12)    Creatinine: 0.98 mg/dL      BUN/Cr: Blood Urea Nitrogen: 8 mg/dL (23 @ 01:12)  /Creatinine: 0.98 mg/dL (23 @ 01:12)    AST/ALTAspartate Aminotransferase (AST/SGOT): 228 U/L (23 @ 01:12)  /Alanine Aminotransferase (ALT/SGPT): 754 U/L (23 @ 01:12)    ALP Alkaline Phosphatase: 206 U/L (23 @ 01:12)    T/Dbili /  INR: INR: 1.01 ratio (23 @ 01:12)      EGD/[place]:      Imaging:  CT Abdomen and Pelvis w/ IV Cont:   ACC: [id number] EXAM:  CT ABDOMEN AND PELVIS IC   ORDERED BY: MARCIN HAMLIN     PROCEDURE DATE:  2023          INTERPRETATION:  CLINICAL INFORMATION: One week of epigastric abdominal   pain, pale stools, yellowing of the eyes and skin over past 2days, total   bilirubin 7.4    COMPARISON: None.    CONTRAST/COMPLICATIONS:  IV Contrast: Omnipaque 350  90 cc administered   10 cc discarded  Oral Contrast: NONE  Complications: None reported at time of study completion    PROCEDURE:  CT of the Abdomen and Pelvis was performed.  Arterial and Portal Venous phases were acquired.  Sagittal and coronal reformats were performed.    FINDINGS:  LOWER CHEST: Within normal limits.    LIVER: Within normal limits.  BILE DUCTS: Slight enhancement of the proximal CBD. CBD is dilated,   measuring 8 mm. No definitive obstructive gallstone visualized.  GALLBLADDER: Cholelithiasis and sludge. Edematous wall, with slight   pericholecystic fat stranding.  SPLEEN: Within normal limits.  PANCREAS: Prominence of the head and uncinate process with subtle   inflammatory changes, particularly around the second portion of the   duodenum. The adjacent duodenum appears thickened. The main pancreatic   duct is nondilated.  ADRENALS: Within normal limits.  KIDNEYS/URETERS: Within normal limits.    BLADDER: Within normal limits.  REPRODUCTIVE ORGANS: Prostate within normal limits.    BOWEL: No bowel obstruction. Appendix is normal. Wall thickening of the   proximal portion of the duodenum. Small subtle radiodensity in the lumen   of the second portion of the duodenum (304-47) No colonic wall thickening   no pericolonic inflammatory changes.  PERITONEUM: No ascites.  VESSELS: Within normal limits.  RETROPERITONEUM/LYMPH NODES: No lymphadenopathy.  ABDOMINAL WALL: Within normal limits.  BONES: Degenerative changes.    IMPRESSION:  Acute cholecystitis. Gallstone pancreatitis.  Mildly dilated common bile duct with wall enhancement. No identifiable   radiopaque CBD stone. Consider contrast-enhanced MRI abdomen with MRCP   for further evaluation.            --- End of Report ---          PATRICIO PRATT MD; Resident Radiologist  This document has been electronically signed.  ELADIO FULLER MD; Attending Radiologist  This document has been electronically signed. 2023  4:51AM (23 @ 04:13)      FamHx: ***no h/o GI malignancies known  PMH/PSH:  PAST MEDICAL & SURGICAL HISTORY:  No pertinent past medical history          MEDS:  MEDICATIONS  (STANDING):  acetaminophen   IVPB .. 1000 milliGRAM(s) IV Intermittent every 6 hours  lactated ringers. 1000 milliLiter(s) (100 mL/Hr) IV Continuous <Continuous>  piperacillin/tazobactam IVPB.- 3.375 Gram(s) IV Intermittent once  piperacillin/tazobactam IVPB.- 3.375 Gram(s) IV Intermittent once    MEDICATIONS  (PRN):  HYDROmorphone  Injectable 0.2 milliGRAM(s) IV Push every 4 hours PRN Moderate Pain (4 - 6)  HYDROmorphone  Injectable 0.5 milliGRAM(s) IV Push every 4 hours PRN Severe Pain (7 - 10)    Allergies    No Known Allergies    Intolerances        ROS neg except as listed above  Neg for F/C, weight loss, vision changes, SOB, HENRIQUEZ, CP, LE edema. GI ROS as listed in HPI    ______________________________________________________________________  PHYSICAL EXAM:  T(C): 36.9 (23 @ 06:47), Max: 37.2 (23 @ 01:08)  HR: 60 (23 @ 06:47)  BP: 105/63 (23 @ 06:47)  RR: 18 (23 @ 06:47)  SpO2: 99% (23 @ 06:47)  Wt(kg): --    GEN: NAD, normocephalic  CVS: Normal rate, HD stable  CHEST: No signs of respiratory distress, breathing comfortably, no accessory muscle usage  ABD: soft , nontender, nondistended, bowel sounds present  EXTR: no cyanosis, no clubbing, no edema  NEURO: Awake and alert, conversant  SKIN:  warm;  non icteric     INIITIAL GI CONSULTATION  HPI:  37yo w/ no significant PMHx presenting w/ 1 week of abdominal pain.     Pt reports he was in [facility] until 1 week ago when he began to develop epigastric discomfort that is described as gassy pain. Worse w/ movement. No assocaition w/ food. Pain became constant so he presented to outpt GI who ran labs and was called to come to ED for imaging when liver enzymes were significantly elevated. No similar sxs in past, denies any drinking, medications, fever/schills. Has noticed ligher stools, dark urine and yellowing of his eyes over past several days.       In the ED, afebrile, HR 60-80s, /70-80s. Labs notable for TB 7.4, Alk phos 206, AST//754. WBC 8.30, INR 1.01. CT showed acute cholecystitis, gallstone pancreatitis, mildly dilated CBD w/ wall enhancement.   No identified radiopaque CBD stone.     Reportedly had EGD as a teenager that showed "slight narrowing of duodenum".        FamHx: ***no h/o GI malignancies known  PMH/PSH:  PAST MEDICAL & SURGICAL HISTORY:  No pertinent past medical history          MEDS:  MEDICATIONS  (STANDING):  acetaminophen   IVPB .. 1000 milliGRAM(s) IV Intermittent every 6 hours  lactated ringers. 1000 milliLiter(s) (100 mL/Hr) IV Continuous <Continuous>  piperacillin/tazobactam IVPB.- 3.375 Gram(s) IV Intermittent once  piperacillin/tazobactam IVPB.- 3.375 Gram(s) IV Intermittent once    MEDICATIONS  (PRN):  HYDROmorphone  Injectable 0.2 milliGRAM(s) IV Push every 4 hours PRN Moderate Pain (4 - 6)  HYDROmorphone  Injectable 0.5 milliGRAM(s) IV Push every 4 hours PRN Severe Pain (7 - 10)    Allergies    No Known Allergies    Intolerances        ROS neg except as listed above  Neg for F/C, weight loss, vision changes, SOB, HENRIQUEZ, CP, LE edema. GI ROS as listed in HPI    ______________________________________________________________________  PHYSICAL EXAM:  T(C): 36.9 (23 @ 06:47), Max: 37.2 (23 @ 01:08)  HR: 60 (23 @ 06:47)  BP: 105/63 (23 @ 06:47)  RR: 18 (23 @ 06:47)  SpO2: 99% (23 @ 06:47)  Wt(kg): --    GEN: NAD, normocephalic  CVS: Normal rate, HD stable  CHEST: No signs of respiratory distress, breathing comfortably, no accessory muscle usage  ABD: soft , mildly distended in epigastric region w/o rebound/guarding  EXTR: no cyanosis, no clubbing, no edema  NEURO: Awake and alert, conversant  SKIN:  warm;  non icteric          Labs/Imaging reviewed.                        15.1   8.30  )-----------( 268      ( 2023 01:12 )             44.5     Last Hb:Hemoglobin: 15.1 g/dL (23 @ 01:12)               137   |  102   |  8                  Ca: 8.9    BMP:   ----------------------------< 125    M.40  (23 @ 01:12)             3.6    |  24    | 0.98               Ph: x        LFT:     TPro: 7.3 / Alb: 4.4 / TBili: 7.4 / DBili: x / AST: 228 / ALT: 754 / AlkPhos: 206   (23 @ 01:12)    Creatinine: 0.98 mg/dL      BUN/Cr: Blood Urea Nitrogen: 8 mg/dL (23 01:12)  /Creatinine: 0.98 mg/dL (23 @ 01:12)    AST/ALTAspartate Aminotransferase (AST/SGOT): 228 U/L (23 01:12)  /Alanine Aminotransferase (ALT/SGPT): 754 U/L (23 @ 01:12)    ALP Alkaline Phosphatase: 206 U/L (23 @ 01:12)    T/Dbili /  INR: INR: 1.01 ratio (23 @ 01:12)      EGD/[facility]:      Imaging:  CT Abdomen and Pelvis w/ IV Cont:   ACC: [id number] EXAM:  CT ABDOMEN AND PELVIS IC   ORDERED BY: MARCIN HAMLIN     PROCEDURE DATE:  2023          INTERPRETATION:  CLINICAL INFORMATION: One week of epigastric abdominal   pain, pale stools, yellowing of the eyes and skin over past 2days, total   bilirubin 7.4    COMPARISON: None.    CONTRAST/COMPLICATIONS:  IV Contrast: Omnipaque 350  90 cc administered   10 cc discarded  Oral Contrast: NONE  Complications: None reported at time of study completion    PROCEDURE:  CT of the Abdomen and Pelvis was performed.  Arterial and Portal Venous phases were acquired.  Sagittal and coronal reformats were performed.    FINDINGS:  LOWER CHEST: Within normal limits.    LIVER: Within normal limits.  BILE DUCTS: Slight enhancement of the proximal CBD. CBD is dilated,   measuring 8 mm. No definitive obstructive gallstone visualized.  GALLBLADDER: Cholelithiasis and sludge. Edematous wall, with slight   pericholecystic fat stranding.  SPLEEN: Within normal limits.  PANCREAS: Prominence of the head and uncinate process with subtle   inflammatory changes, particularly around the second portion of the   duodenum. The adjacent duodenum appears thickened. The main pancreatic   duct is nondilated.  ADRENALS: Within normal limits.  KIDNEYS/URETERS: Within normal limits.    BLADDER: Within normal limits.  REPRODUCTIVE ORGANS: Prostate within normal limits.    BOWEL: No bowel obstruction. Appendix is normal. Wall thickening of the   proximal portion of the duodenum. Small subtle radiodensity in the lumen   of the second portion of the duodenum (304-47) No colonic wall thickening   no pericolonic inflammatory changes.  PERITONEUM: No ascites.  VESSELS: Within normal limits.  RETROPERITONEUM/LYMPH NODES: No lymphadenopathy.  ABDOMINAL WALL: Within normal limits.  BONES: Degenerative changes.    IMPRESSION:  Acute cholecystitis. Gallstone pancreatitis.  Mildly dilated common bile duct with wall enhancement. No identifiable   radiopaque CBD stone. Consider contrast-enhanced MRI abdomen with MRCP   for further evaluation.      --- End of Report ---

## 2023-11-18 NOTE — ED ADULT NURSE NOTE - NSFALLLASTSIX_ED_ALL_ED
Problem: Plan of Care - These are the overarching goals to be used throughout the patient stay.    Goal: Optimal Comfort and Wellbeing  Outcome: Ongoing, Progressing     Problem: Risk for Delirium  Goal: Optimal Coping  Outcome: Ongoing, Progressing  Goal: Improved Behavioral Control  Outcome: Ongoing, Progressing  Goal: Improved Attention and Thought Clarity  Outcome: Ongoing, Progressing  Goal: Improved Sleep  Outcome: Ongoing, Progressing     Problem: Electrolyte Imbalance  Goal: Electrolyte Balance  Outcome: Ongoing, Progressing    Pt denies pain or nausea overnight. Pt ambulated to bathroom x3. Pt alter and oriented. Pt continues to be NPO besides meds and ice chips per orders. Pt on K+ protocol, recheck in AM. VS unchanged.    Lisandro Whitt RN     No.

## 2023-11-18 NOTE — CONSULT NOTE ADULT - ATTENDING COMMENTS
38 year old man presents with abdominal pain, light stools, dark urine and jaundice. Bloodwork done as outpatient reportedly showed abnormal liver enzymes. Found to have pancreatitis (imaging findings, abdominal pain and elevated lipase). Also with concern for cholecystitis on imaging with dilated CBD. Picture concerning for choledocholithiasis. Pt notes significant improvement in pain today suspicious of passed stone. Agree with MRCP and trending liver enzymes/bili.

## 2023-11-18 NOTE — ED ADULT NURSE NOTE - CHIEF COMPLAINT QUOTE
Pt st" I been having discomfort like acid reflux...went to my Gi md did blood work told to go to ER for further eval may have a CBD blockage...have not been able to sleep for past 5 days. ." pt localizing pain to epigastric area

## 2023-11-18 NOTE — PATIENT PROFILE ADULT - FALL HARM RISK - UNIVERSAL INTERVENTIONS
Bed in lowest position, wheels locked, appropriate side rails in place/Call bell, personal items and telephone in reach/Instruct patient to call for assistance before getting out of bed or chair/Non-slip footwear when patient is out of bed/[place] to call system/Physically safe environment - no spills, clutter or unnecessary equipment/Purposeful Proactive Rounding/Room/bathroom lighting operational, light cord in reach

## 2023-11-18 NOTE — H&P ADULT - ASSESSMENT
37 yo M w/ no significnat PMHx or PSHx of presents to the ED w/ 1 week of epigastric abdominal discomfort and reflux. In the ED, AVSS. Labs show no leukocytosis. T bili of 7.4, AST//755, lipase >3000. CT A/P shows c/f acute cholecystitis and gallstone pancreatitis with CBD 8mm.     Plan:   - Admit to B Team Surgery under Dr. Kapadia  - GI c/s for ERCP. Will order MRCP  - Diet: NPO/IVF   - IV abx: Zosyn   - Pain control prn   - DVT ppx: holding for ERCP   - Plan for lap cholecystectomy this admission       B Team Surgery   [id number] 39 yo M w/ no significant PMHx or PSHx of presents to the ED w/ 1 week of epigastric abdominal discomfort and reflux. In the ED, AVSS. Labs show no leukocytosis. T bili of 7.4, AST//755, lipase >3000. CT A/P shows c/f acute cholecystitis and gallstone pancreatitis with CBD 8mm.     Plan:   - Admit to B Team Surgery under Dr. Kapadia  - GI c/s for ERCP placed. Will order MRCP  - Diet: NPO/IVF   - IV abx: Zosyn   - Pain control prn   - No home meds/known allergies   - DVT ppx: holding for possible ERCP   - Plan for lap cholecystectomy this admission   - Dispo: surgical floor       B Team Surgery   [id number] 39 yo M w/ no significant PMHx or PSHx of presents to the ED w/ 1 week of epigastric abdominal discomfort and reflux. In the ED, AVSS. Labs show no leukocytosis. T bili of 7.4, AST//755, lipase >3000. CT A/P shows c/f acute cholecystitis and gallstone pancreatitis with CBD 8mm. RUQ c/f acute cholecystitis, measuring CBD 5mm.     Plan:   - Admit to B Team Surgery under Dr. Kapadia  - GI c/s for ERCP placed. Will order MRCP  - Diet: NPO/IVF   - IV abx: Zosyn   - Pain control prn   - No home meds/no known allergies   - DVT ppx: holding for possible ERCP   - Plan for lap cholecystectomy this admission   - Dispo: surgical floor       B Team Surgery   [id number]

## 2023-11-19 ENCOUNTER — RESULT REVIEW (OUTPATIENT)
Age: 38
End: 2023-11-19

## 2023-11-19 LAB
ALBUMIN SERPL ELPH-MCNC: 3.4 G/DL — SIGNIFICANT CHANGE UP (ref 3.3–5)
ALBUMIN SERPL ELPH-MCNC: 3.4 G/DL — SIGNIFICANT CHANGE UP (ref 3.3–5)
ALP SERPL-CCNC: 158 U/L — HIGH (ref 40–120)
ALP SERPL-CCNC: 158 U/L — HIGH (ref 40–120)
ALT FLD-CCNC: 477 U/L — HIGH (ref 4–41)
ALT FLD-CCNC: 477 U/L — HIGH (ref 4–41)
ANION GAP SERPL CALC-SCNC: 11 MMOL/L — SIGNIFICANT CHANGE UP (ref 7–14)
ANION GAP SERPL CALC-SCNC: 11 MMOL/L — SIGNIFICANT CHANGE UP (ref 7–14)
APTT BLD: 34.5 SEC — SIGNIFICANT CHANGE UP (ref 24.5–35.6)
APTT BLD: 34.5 SEC — SIGNIFICANT CHANGE UP (ref 24.5–35.6)
AST SERPL-CCNC: 90 U/L — HIGH (ref 4–40)
AST SERPL-CCNC: 90 U/L — HIGH (ref 4–40)
BILIRUB DIRECT SERPL-MCNC: 1.4 MG/DL — HIGH (ref 0–0.3)
BILIRUB DIRECT SERPL-MCNC: 1.4 MG/DL — HIGH (ref 0–0.3)
BILIRUB INDIRECT FLD-MCNC: 1.4 MG/DL — HIGH (ref 0–1)
BILIRUB INDIRECT FLD-MCNC: 1.4 MG/DL — HIGH (ref 0–1)
BILIRUB SERPL-MCNC: 2.8 MG/DL — HIGH (ref 0.2–1.2)
BILIRUB SERPL-MCNC: 2.8 MG/DL — HIGH (ref 0.2–1.2)
BLD GP AB SCN SERPL QL: NEGATIVE — SIGNIFICANT CHANGE UP
BLD GP AB SCN SERPL QL: NEGATIVE — SIGNIFICANT CHANGE UP
BUN SERPL-MCNC: 14 MG/DL — SIGNIFICANT CHANGE UP (ref 7–23)
BUN SERPL-MCNC: 14 MG/DL — SIGNIFICANT CHANGE UP (ref 7–23)
CALCIUM SERPL-MCNC: 8.7 MG/DL — SIGNIFICANT CHANGE UP (ref 8.4–10.5)
CALCIUM SERPL-MCNC: 8.7 MG/DL — SIGNIFICANT CHANGE UP (ref 8.4–10.5)
CHLORIDE SERPL-SCNC: 106 MMOL/L — SIGNIFICANT CHANGE UP (ref 98–107)
CHLORIDE SERPL-SCNC: 106 MMOL/L — SIGNIFICANT CHANGE UP (ref 98–107)
CO2 SERPL-SCNC: 21 MMOL/L — LOW (ref 22–31)
CO2 SERPL-SCNC: 21 MMOL/L — LOW (ref 22–31)
CREAT SERPL-MCNC: 0.97 MG/DL — SIGNIFICANT CHANGE UP (ref 0.5–1.3)
CREAT SERPL-MCNC: 0.97 MG/DL — SIGNIFICANT CHANGE UP (ref 0.5–1.3)
EGFR: 101 ML/MIN/1.73M2 — SIGNIFICANT CHANGE UP
EGFR: 101 ML/MIN/1.73M2 — SIGNIFICANT CHANGE UP
GLUCOSE SERPL-MCNC: 81 MG/DL — SIGNIFICANT CHANGE UP (ref 70–99)
GLUCOSE SERPL-MCNC: 81 MG/DL — SIGNIFICANT CHANGE UP (ref 70–99)
HCT VFR BLD CALC: 37.4 % — LOW (ref 39–50)
HCT VFR BLD CALC: 37.4 % — LOW (ref 39–50)
HGB BLD-MCNC: 12.7 G/DL — LOW (ref 13–17)
HGB BLD-MCNC: 12.7 G/DL — LOW (ref 13–17)
INR BLD: 1.01 RATIO — SIGNIFICANT CHANGE UP (ref 0.85–1.18)
INR BLD: 1.01 RATIO — SIGNIFICANT CHANGE UP (ref 0.85–1.18)
MAGNESIUM SERPL-MCNC: 2.2 MG/DL — SIGNIFICANT CHANGE UP (ref 1.6–2.6)
MAGNESIUM SERPL-MCNC: 2.2 MG/DL — SIGNIFICANT CHANGE UP (ref 1.6–2.6)
MCHC RBC-ENTMCNC: 27.7 PG — SIGNIFICANT CHANGE UP (ref 27–34)
MCHC RBC-ENTMCNC: 27.7 PG — SIGNIFICANT CHANGE UP (ref 27–34)
MCHC RBC-ENTMCNC: 34 GM/DL — SIGNIFICANT CHANGE UP (ref 32–36)
MCHC RBC-ENTMCNC: 34 GM/DL — SIGNIFICANT CHANGE UP (ref 32–36)
MCV RBC AUTO: 81.5 FL — SIGNIFICANT CHANGE UP (ref 80–100)
MCV RBC AUTO: 81.5 FL — SIGNIFICANT CHANGE UP (ref 80–100)
NRBC # BLD: 0 /100 WBCS — SIGNIFICANT CHANGE UP (ref 0–0)
NRBC # BLD: 0 /100 WBCS — SIGNIFICANT CHANGE UP (ref 0–0)
NRBC # FLD: 0 K/UL — SIGNIFICANT CHANGE UP (ref 0–0)
NRBC # FLD: 0 K/UL — SIGNIFICANT CHANGE UP (ref 0–0)
PHOSPHATE SERPL-MCNC: 3.8 MG/DL — SIGNIFICANT CHANGE UP (ref 2.5–4.5)
PHOSPHATE SERPL-MCNC: 3.8 MG/DL — SIGNIFICANT CHANGE UP (ref 2.5–4.5)
PLATELET # BLD AUTO: 198 K/UL — SIGNIFICANT CHANGE UP (ref 150–400)
PLATELET # BLD AUTO: 198 K/UL — SIGNIFICANT CHANGE UP (ref 150–400)
POTASSIUM SERPL-MCNC: 3.6 MMOL/L — SIGNIFICANT CHANGE UP (ref 3.5–5.3)
POTASSIUM SERPL-MCNC: 3.6 MMOL/L — SIGNIFICANT CHANGE UP (ref 3.5–5.3)
POTASSIUM SERPL-SCNC: 3.6 MMOL/L — SIGNIFICANT CHANGE UP (ref 3.5–5.3)
POTASSIUM SERPL-SCNC: 3.6 MMOL/L — SIGNIFICANT CHANGE UP (ref 3.5–5.3)
PROT SERPL-MCNC: 6.1 G/DL — SIGNIFICANT CHANGE UP (ref 6–8.3)
PROT SERPL-MCNC: 6.1 G/DL — SIGNIFICANT CHANGE UP (ref 6–8.3)
PROTHROM AB SERPL-ACNC: 11.3 SEC — SIGNIFICANT CHANGE UP (ref 9.5–13)
PROTHROM AB SERPL-ACNC: 11.3 SEC — SIGNIFICANT CHANGE UP (ref 9.5–13)
RBC # BLD: 4.59 M/UL — SIGNIFICANT CHANGE UP (ref 4.2–5.8)
RBC # BLD: 4.59 M/UL — SIGNIFICANT CHANGE UP (ref 4.2–5.8)
RBC # FLD: 12.9 % — SIGNIFICANT CHANGE UP (ref 10.3–14.5)
RBC # FLD: 12.9 % — SIGNIFICANT CHANGE UP (ref 10.3–14.5)
RH IG SCN BLD-IMP: POSITIVE — SIGNIFICANT CHANGE UP
RH IG SCN BLD-IMP: POSITIVE — SIGNIFICANT CHANGE UP
SODIUM SERPL-SCNC: 138 MMOL/L — SIGNIFICANT CHANGE UP (ref 135–145)
SODIUM SERPL-SCNC: 138 MMOL/L — SIGNIFICANT CHANGE UP (ref 135–145)
WBC # BLD: 3.8 K/UL — SIGNIFICANT CHANGE UP (ref 3.8–10.5)
WBC # BLD: 3.8 K/UL — SIGNIFICANT CHANGE UP (ref 3.8–10.5)
WBC # FLD AUTO: 3.8 K/UL — SIGNIFICANT CHANGE UP (ref 3.8–10.5)
WBC # FLD AUTO: 3.8 K/UL — SIGNIFICANT CHANGE UP (ref 3.8–10.5)

## 2023-11-19 PROCEDURE — 47562 LAPAROSCOPIC CHOLECYSTECTOMY: CPT | Mod: GC

## 2023-11-19 PROCEDURE — 88304 TISSUE EXAM BY PATHOLOGIST: CPT | Mod: 26

## 2023-11-19 PROCEDURE — 99232 SBSQ HOSP IP/OBS MODERATE 35: CPT | Mod: GC,57

## 2023-11-19 DEVICE — CLIP APPLIER COVIDIEN ENDOCLIP III 5MM: Type: IMPLANTABLE DEVICE | Status: FUNCTIONAL

## 2023-11-19 DEVICE — CATH SPYGLASS DISCOVER IMAGER II IOC: Type: IMPLANTABLE DEVICE | Status: FUNCTIONAL

## 2023-11-19 DEVICE — CATH REDDICK W/TROCAR 4FRX50CM: Type: IMPLANTABLE DEVICE | Status: FUNCTIONAL

## 2023-11-19 RX ORDER — HYDROMORPHONE HYDROCHLORIDE 2 MG/ML
0.5 INJECTION INTRAMUSCULAR; INTRAVENOUS; SUBCUTANEOUS
Refills: 0 | Status: DISCONTINUED | OUTPATIENT
Start: 2023-11-19 | End: 2023-11-20

## 2023-11-19 RX ORDER — ONDANSETRON 8 MG/1
4 TABLET, FILM COATED ORAL ONCE
Refills: 0 | Status: DISCONTINUED | OUTPATIENT
Start: 2023-11-19 | End: 2023-11-19

## 2023-11-19 RX ADMIN — ENOXAPARIN SODIUM 40 MILLIGRAM(S): 100 INJECTION SUBCUTANEOUS at 21:56

## 2023-11-19 RX ADMIN — SODIUM CHLORIDE 100 MILLILITER(S): 9 INJECTION, SOLUTION INTRAVENOUS at 06:18

## 2023-11-19 RX ADMIN — PIPERACILLIN AND TAZOBACTAM 25 GRAM(S): 4; .5 INJECTION, POWDER, LYOPHILIZED, FOR SOLUTION INTRAVENOUS at 06:18

## 2023-11-19 RX ADMIN — HYDROMORPHONE HYDROCHLORIDE 0.5 MILLIGRAM(S): 2 INJECTION INTRAMUSCULAR; INTRAVENOUS; SUBCUTANEOUS at 17:48

## 2023-11-19 RX ADMIN — PIPERACILLIN AND TAZOBACTAM 25 GRAM(S): 4; .5 INJECTION, POWDER, LYOPHILIZED, FOR SOLUTION INTRAVENOUS at 21:56

## 2023-11-19 RX ADMIN — Medication 400 MILLIGRAM(S): at 02:24

## 2023-11-19 RX ADMIN — PIPERACILLIN AND TAZOBACTAM 25 GRAM(S): 4; .5 INJECTION, POWDER, LYOPHILIZED, FOR SOLUTION INTRAVENOUS at 00:15

## 2023-11-19 RX ADMIN — HYDROMORPHONE HYDROCHLORIDE 0.5 MILLIGRAM(S): 2 INJECTION INTRAMUSCULAR; INTRAVENOUS; SUBCUTANEOUS at 18:00

## 2023-11-19 RX ADMIN — HYDROMORPHONE HYDROCHLORIDE 0.5 MILLIGRAM(S): 2 INJECTION INTRAMUSCULAR; INTRAVENOUS; SUBCUTANEOUS at 22:39

## 2023-11-19 RX ADMIN — HYDROMORPHONE HYDROCHLORIDE 0.5 MILLIGRAM(S): 2 INJECTION INTRAMUSCULAR; INTRAVENOUS; SUBCUTANEOUS at 22:55

## 2023-11-19 NOTE — BRIEF OPERATIVE NOTE - OPERATION/FINDINGS
Aborted IOC due to inability to safely pass catheter into cystic duct. Critical view of safety achieved.

## 2023-11-19 NOTE — CHART NOTE - NSCHARTNOTEFT_GEN_A_CORE
POST-OP NOTE:    Procedure: laparoscopic cholecystectomy    Subjective: Patient was assessed 4 hours after surgery, reporting pain well controlled, tolerating liquids, ambulating, urinating appropriately, no N/V. Does report mild discomfort associated with gas, content with managing by ambulating. No concerns otherwise reported.     Vital Signs Last 24 Hrs  T(C): 36.7 (19 Nov 2023 22:00), Max: 37.1 (19 Nov 2023 17:05)  T(F): 98 (19 Nov 2023 22:00), Max: 98.8 (19 Nov 2023 17:05)  HR: 59 (19 Nov 2023 22:00) (54 - 79)  BP: 119/71 (19 Nov 2023 22:00) (103/61 - 145/84)  BP(mean): 96 (19 Nov 2023 18:15) (70 - 96)  RR: 17 (19 Nov 2023 22:00) (13 - 20)  SpO2: 100% (19 Nov 2023 22:00) (95% - 100%)    Parameters below as of 19 Nov 2023 19:05  Patient On (Oxygen Delivery Method): room air      I&O's Summary    19 Nov 2023 07:01  -  19 Nov 2023 22:10  --------------------------------------------------------  IN: 800 mL / OUT: 900 mL / NET: -100 mL                            12.7   3.80  )-----------( 198      ( 19 Nov 2023 05:41 )             37.4     11-19    138  |  106  |  14  ----------------------------<  81  3.6   |  21<L>  |  0.97    Ca    8.7      19 Nov 2023 05:41  Phos  3.8     11-19  Mg     2.20     11-19    TPro  6.1  /  Alb  3.4  /  TBili  2.8<H>  /  DBili  1.4<H>  /  AST  90<H>  /  ALT  477<H>  /  AlkPhos  158<H>  11-19   PT/INR - ( 19 Nov 2023 05:41 )   PT: 11.3 sec;   INR: 1.01 ratio         PTT - ( 19 Nov 2023 05:41 )  PTT:34.5 sec    PHYSICAL EXAM:  Gen: NAD, well-developed  Resp: breathing easily, no stridor  CV: no peripheral edema/cyanosis  Abd: soft, non-tender to palpation, port-site incision with dermabond c/d/i  Extremities: Grossly symmetric b/l    Plan:  -Diet  -Pain regimen  -OOB  -Monitor AM labs for liver function iso initial admission concern for gall.panc    B-Team Surgery  [id number] POST-OP NOTE:    Procedure: laparoscopic cholecystectomy    Subjective: Patient was assessed 4 hours after surgery, reporting pain well controlled, tolerating liquids, ambulating, urinating appropriately, no N/V. Does report mild discomfort associated with gas, content with managing by ambulating. No concerns otherwise reported.     Vital Signs Last 24 Hrs  T(C): 36.7 (19 Nov 2023 22:00), Max: 37.1 (19 Nov 2023 17:05)  T(F): 98 (19 Nov 2023 22:00), Max: 98.8 (19 Nov 2023 17:05)  HR: 59 (19 Nov 2023 22:00) (54 - 79)  BP: 119/71 (19 Nov 2023 22:00) (103/61 - 145/84)  BP(mean): 96 (19 Nov 2023 18:15) (70 - 96)  RR: 17 (19 Nov 2023 22:00) (13 - 20)  SpO2: 100% (19 Nov 2023 22:00) (95% - 100%)    Parameters below as of 19 Nov 2023 19:05  Patient On (Oxygen Delivery Method): room air      I&O's Summary    19 Nov 2023 07:01  -  19 Nov 2023 22:10  --------------------------------------------------------  IN: 800 mL / OUT: 900 mL / NET: -100 mL                            12.7   3.80  )-----------( 198      ( 19 Nov 2023 05:41 )             37.4     11-19    138  |  106  |  14  ----------------------------<  81  3.6   |  21<L>  |  0.97    Ca    8.7      19 Nov 2023 05:41  Phos  3.8     11-19  Mg     2.20     11-19    TPro  6.1  /  Alb  3.4  /  TBili  2.8<H>  /  DBili  1.4<H>  /  AST  90<H>  /  ALT  477<H>  /  AlkPhos  158<H>  11-19   PT/INR - ( 19 Nov 2023 05:41 )   PT: 11.3 sec;   INR: 1.01 ratio         PTT - ( 19 Nov 2023 05:41 )  PTT:34.5 sec    PHYSICAL EXAM:  Gen: NAD, well-developed  Resp: breathing easily, no stridor  CV: no peripheral edema/cyanosis  Abd: soft, non-tender to palpation, port-site incision with dermabond c/d/i  Extremities: Grossly symmetric b/l    Plan:  -Diet: CLD  -Pain regimen  -OOB  -Monitor AM labs for liver function iso initial admission concern for gall.panc    B-Team Surgery  [id number]

## 2023-11-19 NOTE — PROGRESS NOTE ADULT - SUBJECTIVE AND OBJECTIVE BOX
TEAM [B] Surgery Daily Progress Note  =====================================================    SUBJECTIVE: Patient seen and examined at bedside on AM rounds. Patient presented over night with epigastric pain, CT A&P showing acute cholecystitis and gallstone panc. Reports improving abdominal pain. No n/v.     PMH:  PAST MEDICAL & SURGICAL HISTORY:  No pertinent past medical history    ALLERGIES:  No Known Allergies      --------------------------------------------------------------------------------------    MEDICATIONS:    Neurologic Medications  HYDROmorphone  Injectable 0.2 milliGRAM(s) IV Push every 4 hours PRN Moderate Pain (4 - 6)  HYDROmorphone  Injectable 0.5 milliGRAM(s) IV Push every 4 hours PRN Severe Pain (7 - 10)    Respiratory Medications    Cardiovascular Medications    Gastrointestinal Medications  lactated ringers. 1000 milliLiter(s) IV Continuous <Continuous>    Genitourinary Medications    Hematologic/Oncologic Medications  enoxaparin Injectable 40 milliGRAM(s) SubCutaneous every 24 hours    Antimicrobial/Immunologic Medications  piperacillin/tazobactam IVPB.. 3.375 Gram(s) IV Intermittent every 8 hours    Endocrine/Metabolic Medications    Topical/Other Medications    --------------------------------------------------------------------------------------    VITAL SIGNS:  Vital Signs Last 24 Hrs  T(C): 36.7 (19 Nov 2023 08:00), Max: 36.8 (18 Nov 2023 12:23)  T(F): 98 (19 Nov 2023 08:00), Max: 98.3 (18 Nov 2023 12:23)  HR: 54 (19 Nov 2023 08:00) (53 - 58)  BP: 116/75 (19 Nov 2023 08:00) (103/60 - 116/75)  BP(mean): 84 (19 Nov 2023 08:00) (84 - 84)  RR: 16 (19 Nov 2023 08:00) (16 - 18)  SpO2: 100% (19 Nov 2023 08:00) (99% - 100%)    Parameters below as of 19 Nov 2023 08:00  Patient On (Oxygen Delivery Method): room air      --------------------------------------------------------------------------------------    EXAM    GEN: NAD, resting quietly. Scleral icterus   NEURO: AAOx3, no focal deficits  PULM: symmetric chest rise bilaterally, no increased WOB  CV: appears well perfused   ABD: Soft, nondistended, tender to deep palpation in epigastrium.  without rebound tenderness/guarding/rigidity  EXTR: no cyanosis or edema, moving all extremities    --------------------------------------------------------------------------------------    LABS                        12.7   3.80  )-----------( 198      ( 19 Nov 2023 05:41 )             37.4   11-19    138  |  106  |  14  ----------------------------<  81  3.6   |  21<L>  |  0.97    Ca    8.7      19 Nov 2023 05:41  Phos  3.8     11-19  Mg     2.20     11-19    TPro  6.1  /  Alb  3.4  /  TBili  2.8<H>  /  DBili  1.4<H>  /  AST  90<H>  /  ALT  477<H>  /  AlkPhos  158<H>  11-19      --------------------------------------------------------------------------------------

## 2023-11-19 NOTE — PROGRESS NOTE ADULT - ASSESSMENT
38M presenting with 1 week of epigastric abdominal discomfort and reflux, found to have a Tbili of 7.4 and lipase >3000, CT A&P showing acute cholecystitis and gallstone pancreatitis, now Tbili downtrending suggesting a passed stone, patient will be going to OR for lap nikolas with IOC.        Plan:   - Diet: NPO/IVF   - IV abx: Zosyn   - Pain control prn   - DVT ppx: holding for possible ERCP   - Plan for lap cholecystectomy today    B Team Surgery   [id number]

## 2023-11-20 ENCOUNTER — TRANSCRIPTION ENCOUNTER (OUTPATIENT)
Age: 38
End: 2023-11-20

## 2023-11-20 LAB
ALBUMIN SERPL ELPH-MCNC: 3.5 G/DL — SIGNIFICANT CHANGE UP (ref 3.3–5)
ALBUMIN SERPL ELPH-MCNC: 3.5 G/DL — SIGNIFICANT CHANGE UP (ref 3.3–5)
ALBUMIN SERPL ELPH-MCNC: 3.6 G/DL — SIGNIFICANT CHANGE UP (ref 3.3–5)
ALBUMIN SERPL ELPH-MCNC: 3.6 G/DL — SIGNIFICANT CHANGE UP (ref 3.3–5)
ALP SERPL-CCNC: 142 U/L — HIGH (ref 40–120)
ALP SERPL-CCNC: 142 U/L — HIGH (ref 40–120)
ALP SERPL-CCNC: 148 U/L — HIGH (ref 40–120)
ALP SERPL-CCNC: 148 U/L — HIGH (ref 40–120)
ALT FLD-CCNC: 378 U/L — HIGH (ref 4–41)
ALT FLD-CCNC: 378 U/L — HIGH (ref 4–41)
ALT FLD-CCNC: 476 U/L — HIGH (ref 4–41)
ALT FLD-CCNC: 476 U/L — HIGH (ref 4–41)
ANION GAP SERPL CALC-SCNC: 10 MMOL/L — SIGNIFICANT CHANGE UP (ref 7–14)
ANION GAP SERPL CALC-SCNC: 10 MMOL/L — SIGNIFICANT CHANGE UP (ref 7–14)
AST SERPL-CCNC: 195 U/L — HIGH (ref 4–40)
AST SERPL-CCNC: 195 U/L — HIGH (ref 4–40)
AST SERPL-CCNC: 82 U/L — HIGH (ref 4–40)
AST SERPL-CCNC: 82 U/L — HIGH (ref 4–40)
BILIRUB DIRECT SERPL-MCNC: 1 MG/DL — HIGH (ref 0–0.3)
BILIRUB DIRECT SERPL-MCNC: 1 MG/DL — HIGH (ref 0–0.3)
BILIRUB INDIRECT FLD-MCNC: 0.8 MG/DL — SIGNIFICANT CHANGE UP (ref 0–1)
BILIRUB INDIRECT FLD-MCNC: 0.8 MG/DL — SIGNIFICANT CHANGE UP (ref 0–1)
BILIRUB SERPL-MCNC: 1.8 MG/DL — HIGH (ref 0.2–1.2)
BILIRUB SERPL-MCNC: 1.8 MG/DL — HIGH (ref 0.2–1.2)
BILIRUB SERPL-MCNC: 2 MG/DL — HIGH (ref 0.2–1.2)
BILIRUB SERPL-MCNC: 2 MG/DL — HIGH (ref 0.2–1.2)
BUN SERPL-MCNC: 10 MG/DL — SIGNIFICANT CHANGE UP (ref 7–23)
BUN SERPL-MCNC: 10 MG/DL — SIGNIFICANT CHANGE UP (ref 7–23)
CALCIUM SERPL-MCNC: 8.6 MG/DL — SIGNIFICANT CHANGE UP (ref 8.4–10.5)
CALCIUM SERPL-MCNC: 8.6 MG/DL — SIGNIFICANT CHANGE UP (ref 8.4–10.5)
CHLORIDE SERPL-SCNC: 102 MMOL/L — SIGNIFICANT CHANGE UP (ref 98–107)
CHLORIDE SERPL-SCNC: 102 MMOL/L — SIGNIFICANT CHANGE UP (ref 98–107)
CO2 SERPL-SCNC: 25 MMOL/L — SIGNIFICANT CHANGE UP (ref 22–31)
CO2 SERPL-SCNC: 25 MMOL/L — SIGNIFICANT CHANGE UP (ref 22–31)
CREAT SERPL-MCNC: 1 MG/DL — SIGNIFICANT CHANGE UP (ref 0.5–1.3)
CREAT SERPL-MCNC: 1 MG/DL — SIGNIFICANT CHANGE UP (ref 0.5–1.3)
EGFR: 98 ML/MIN/1.73M2 — SIGNIFICANT CHANGE UP
EGFR: 98 ML/MIN/1.73M2 — SIGNIFICANT CHANGE UP
GLUCOSE SERPL-MCNC: 100 MG/DL — HIGH (ref 70–99)
GLUCOSE SERPL-MCNC: 100 MG/DL — HIGH (ref 70–99)
HCT VFR BLD CALC: 38.2 % — LOW (ref 39–50)
HCT VFR BLD CALC: 38.2 % — LOW (ref 39–50)
HGB BLD-MCNC: 12.8 G/DL — LOW (ref 13–17)
HGB BLD-MCNC: 12.8 G/DL — LOW (ref 13–17)
MCHC RBC-ENTMCNC: 27.6 PG — SIGNIFICANT CHANGE UP (ref 27–34)
MCHC RBC-ENTMCNC: 27.6 PG — SIGNIFICANT CHANGE UP (ref 27–34)
MCHC RBC-ENTMCNC: 33.5 GM/DL — SIGNIFICANT CHANGE UP (ref 32–36)
MCHC RBC-ENTMCNC: 33.5 GM/DL — SIGNIFICANT CHANGE UP (ref 32–36)
MCV RBC AUTO: 82.3 FL — SIGNIFICANT CHANGE UP (ref 80–100)
MCV RBC AUTO: 82.3 FL — SIGNIFICANT CHANGE UP (ref 80–100)
NRBC # BLD: 0 /100 WBCS — SIGNIFICANT CHANGE UP (ref 0–0)
NRBC # BLD: 0 /100 WBCS — SIGNIFICANT CHANGE UP (ref 0–0)
NRBC # FLD: 0 K/UL — SIGNIFICANT CHANGE UP (ref 0–0)
NRBC # FLD: 0 K/UL — SIGNIFICANT CHANGE UP (ref 0–0)
PLATELET # BLD AUTO: 236 K/UL — SIGNIFICANT CHANGE UP (ref 150–400)
PLATELET # BLD AUTO: 236 K/UL — SIGNIFICANT CHANGE UP (ref 150–400)
POTASSIUM SERPL-MCNC: 4.2 MMOL/L — SIGNIFICANT CHANGE UP (ref 3.5–5.3)
POTASSIUM SERPL-MCNC: 4.2 MMOL/L — SIGNIFICANT CHANGE UP (ref 3.5–5.3)
POTASSIUM SERPL-SCNC: 4.2 MMOL/L — SIGNIFICANT CHANGE UP (ref 3.5–5.3)
POTASSIUM SERPL-SCNC: 4.2 MMOL/L — SIGNIFICANT CHANGE UP (ref 3.5–5.3)
PROT SERPL-MCNC: 6.1 G/DL — SIGNIFICANT CHANGE UP (ref 6–8.3)
PROT SERPL-MCNC: 6.1 G/DL — SIGNIFICANT CHANGE UP (ref 6–8.3)
PROT SERPL-MCNC: 6.2 G/DL — SIGNIFICANT CHANGE UP (ref 6–8.3)
PROT SERPL-MCNC: 6.2 G/DL — SIGNIFICANT CHANGE UP (ref 6–8.3)
RBC # BLD: 4.64 M/UL — SIGNIFICANT CHANGE UP (ref 4.2–5.8)
RBC # BLD: 4.64 M/UL — SIGNIFICANT CHANGE UP (ref 4.2–5.8)
RBC # FLD: 13 % — SIGNIFICANT CHANGE UP (ref 10.3–14.5)
RBC # FLD: 13 % — SIGNIFICANT CHANGE UP (ref 10.3–14.5)
SODIUM SERPL-SCNC: 137 MMOL/L — SIGNIFICANT CHANGE UP (ref 135–145)
SODIUM SERPL-SCNC: 137 MMOL/L — SIGNIFICANT CHANGE UP (ref 135–145)
WBC # BLD: 6.14 K/UL — SIGNIFICANT CHANGE UP (ref 3.8–10.5)
WBC # BLD: 6.14 K/UL — SIGNIFICANT CHANGE UP (ref 3.8–10.5)
WBC # FLD AUTO: 6.14 K/UL — SIGNIFICANT CHANGE UP (ref 3.8–10.5)
WBC # FLD AUTO: 6.14 K/UL — SIGNIFICANT CHANGE UP (ref 3.8–10.5)

## 2023-11-20 RX ORDER — ACETAMINOPHEN 500 MG
1000 TABLET ORAL EVERY 6 HOURS
Refills: 0 | Status: DISCONTINUED | OUTPATIENT
Start: 2023-11-20 | End: 2023-11-20

## 2023-11-20 RX ORDER — IBUPROFEN 200 MG
600 TABLET ORAL EVERY 6 HOURS
Refills: 0 | Status: DISCONTINUED | OUTPATIENT
Start: 2023-11-20 | End: 2023-11-22

## 2023-11-20 RX ORDER — OXYCODONE HYDROCHLORIDE 5 MG/1
10 TABLET ORAL EVERY 6 HOURS
Refills: 0 | Status: DISCONTINUED | OUTPATIENT
Start: 2023-11-20 | End: 2023-11-22

## 2023-11-20 RX ORDER — ACETAMINOPHEN 500 MG
975 TABLET ORAL EVERY 6 HOURS
Refills: 0 | Status: DISCONTINUED | OUTPATIENT
Start: 2023-11-20 | End: 2023-11-22

## 2023-11-20 RX ORDER — OXYCODONE HYDROCHLORIDE 5 MG/1
5 TABLET ORAL EVERY 6 HOURS
Refills: 0 | Status: DISCONTINUED | OUTPATIENT
Start: 2023-11-20 | End: 2023-11-22

## 2023-11-20 RX ADMIN — Medication 600 MILLIGRAM(S): at 22:35

## 2023-11-20 RX ADMIN — Medication 975 MILLIGRAM(S): at 12:17

## 2023-11-20 RX ADMIN — HYDROMORPHONE HYDROCHLORIDE 0.5 MILLIGRAM(S): 2 INJECTION INTRAMUSCULAR; INTRAVENOUS; SUBCUTANEOUS at 04:45

## 2023-11-20 RX ADMIN — OXYCODONE HYDROCHLORIDE 5 MILLIGRAM(S): 5 TABLET ORAL at 13:10

## 2023-11-20 RX ADMIN — ENOXAPARIN SODIUM 40 MILLIGRAM(S): 100 INJECTION SUBCUTANEOUS at 22:35

## 2023-11-20 RX ADMIN — PIPERACILLIN AND TAZOBACTAM 25 GRAM(S): 4; .5 INJECTION, POWDER, LYOPHILIZED, FOR SOLUTION INTRAVENOUS at 05:09

## 2023-11-20 RX ADMIN — OXYCODONE HYDROCHLORIDE 5 MILLIGRAM(S): 5 TABLET ORAL at 13:49

## 2023-11-20 RX ADMIN — Medication 400 MILLIGRAM(S): at 07:17

## 2023-11-20 RX ADMIN — Medication 975 MILLIGRAM(S): at 22:35

## 2023-11-20 RX ADMIN — Medication 975 MILLIGRAM(S): at 18:52

## 2023-11-20 RX ADMIN — HYDROMORPHONE HYDROCHLORIDE 0.5 MILLIGRAM(S): 2 INJECTION INTRAMUSCULAR; INTRAVENOUS; SUBCUTANEOUS at 04:29

## 2023-11-20 RX ADMIN — Medication 975 MILLIGRAM(S): at 12:47

## 2023-11-20 NOTE — DISCHARGE NOTE PROVIDER - NSDCCPCAREPLAN_GEN_ALL_CORE_FT
PRINCIPAL DISCHARGE DIAGNOSIS  Diagnosis: Gallstone pancreatitis  Assessment and Plan of Treatment: WOUND CARE:  Keep wound clean and dry. Do not scrub or rub incisions. Do not use lotion or powder on incisions.  BATHING: Please do not submerge wound underwater. You may shower and/or sponge bathe.  ACTIVITY: No heavy lifting or straining. Otherwise, you may return to your usual level of physical activity. If you are taking narcotic pain medication (such as Percocet) DO NOT drive a car, operate machinery or make important decisions.  DIET: Return to your usual diet.  NOTIFY YOUR SURGEON IF: You have any bleeding that does not stop, any pus draining from your wound(s), any fever (over 100.4 F) or chills, persistent nausea/vomiting, persistent diarrhea, or if your pain is not controlled on your discharge pain medications.  FOLLOW-UP: Please follow up with your primary care physician in one week regarding your hospitalization. Please follow-up with Dr. Del Cid in 1-2 weeks for follow-up.

## 2023-11-20 NOTE — PROGRESS NOTE ADULT - ASSESSMENT
38M presenting with 1 week of epigastric abdominal discomfort and reflux, found to have a Tbili of 7.4 and lipase >3000, CT A&P showing acute cholecystitis and gallstone pancreatitis, now Tbili downtrending suggesting a passed stone.  Patient now s/p lap choly 11/19.       Plan:  -Diet: CLD  -Pain regimen  -OOB  -f/u am labs (LFTs/Tbili)    B-Team Surgery  [id number].

## 2023-11-20 NOTE — DISCHARGE NOTE PROVIDER - NSDCFUADDINST_GEN_ALL_CORE_FT
WOUND CARE:  Keep wound clean and dry. Do not scrub or rub incisions. Do not use lotion or powder on incisions. BATHING: Please do not submerge wound underwater. You may shower and/or sponge bathe. ACTIVITY: No heavy lifting or straining. Otherwise, you may return to your usual level of physical activity. If you are taking narcotic pain medication (such as Percocet) DO NOT drive a car, operate machinery or make important decisions. DIET: Return to your usual diet. NOTIFY YOUR SURGEON IF: You have any bleeding that does not stop, any pus draining from your wound(s), any fever (over 100.4 F) or chills, persistent nausea/vomiting, persistent diarrhea, or if your pain is not controlled on your discharge pain medications. FOLLOW-UP: Please follow up with your primary care physician in one week regarding your hospitalization. Please follow-up with Dr. Del Cid in 1-2 weeks for follow-up.  WOUND CARE:  Keep wound clean and dry. Do not scrub or rub incisions. Do not use lotion or powder on incisions. BATHING: Please do not submerge wound underwater. You may shower and/or sponge bathe. ACTIVITY: No heavy lifting or straining. Otherwise, you may return to your usual level of physical activity. If you are taking narcotic pain medication (such as Percocet) DO NOT drive a car, operate machinery or make important decisions. DIET: Return to your usual diet. NOTIFY YOUR SURGEON IF: You have any bleeding that does not stop, any pus draining from your wound(s), any fever (over 100.4 F) or chills, persistent nausea/vomiting, persistent diarrhea, or if your pain is not controlled on your discharge pain medications.  MEDICATION: Please take the prescribed medication recommended by the GI doctors.  FOLLOW-UP: Please follow up with your primary care physician in one week regarding your hospitalization. Please follow-up with Dr. Del Cid in 1-2 weeks for follow-up. Please follow-up the results of your EUS biopsy results.

## 2023-11-20 NOTE — DISCHARGE NOTE PROVIDER - NSDCMRMEDTOKEN_GEN_ALL_CORE_FT
acetaminophen 325 mg oral tablet: 3 tab(s) orally every 6 hours  ibuprofen 600 mg oral tablet: 1 tab(s) orally every 6 hours   acetaminophen 325 mg oral tablet: 3 tab(s) orally every 6 hours  ibuprofen 600 mg oral tablet: 1 tab(s) orally every 6 hours  omeprazole 20 mg oral delayed release tablet: 1 tab(s) orally once a day

## 2023-11-20 NOTE — PROGRESS NOTE ADULT - ASSESSMENT
39yo w/ no significant PMHx presenting w/ 1 week of abdominal pain, found to have acute cholecystitis w/ gallstone pancreatitis.    #Cholecystitis  #Gallstone pancreatitis  Etiology of pancreatitis most likely 2/2 gallstone given concurrent cholecystitis. No hx of drinking or recent medication use and w/o family or personal history of pancreatitis. Given clinical improvement suspect stone has likely passed (not visible on CT) especially given relatively small biliary ducts. S/p cholecystectomy 11/19, healing well. No cholangiogram during procedure   Recommendations:  -Pending MRCP to further evaluate bile ducts and +/- stone. If neg, no futher w/u recommended  -No need for acute endoscopic intervention at this time     Note incomplete until finalized by attending signature/attestation.    Rosa Santos  GI/Hepatology Fellow PGY5    NON-URGENT CONSULTS:  Please email [email or web address]  AT NIGHT AND ON WEEKENDS:  Available on [employer] Teams  [phone] ([place] Pager)    After 5pm, please contact the on-call GI fellow. [phone]

## 2023-11-20 NOTE — PROGRESS NOTE ADULT - SUBJECTIVE AND OBJECTIVE BOX
General Surgery Progress Note    Subjective: Patient resting in bed. Patient denies subjective fever/chills, CP, SOB, n/v, or abdominal pain. -Flatus/-BM. Pain well controlled, tolerating diet, +ambulating.  Pain well controlled.     Objective:  Vitals:  T(C): 36.4 (11-20-23 @ 06:00), Max: 37.1 (11-19-23 @ 17:05)  HR: 58 (11-20-23 @ 06:00) (56 - 79)  BP: 111/71 (11-20-23 @ 06:00) (103/61 - 145/84)  RR: 18 (11-20-23 @ 06:00) (13 - 20)  SpO2: 97% (11-20-23 @ 06:00) (95% - 100%)  Wt(kg): --    11-19 @ 07:01  -  11-20 @ 07:00  --------------------------------------------------------  IN:    IV PiggyBack: 200 mL    Lactated Ringers: 1200 mL    Oral Fluid: 1000 mL  Total IN: 2400 mL    OUT:    Voided (mL): 2800 mL  Total OUT: 2800 mL    Total NET: -400 mL          Physical Exam:  General Appearance: no acute distress, NTND   Chest: airway intact, non-labored breathing  CV: no JVD, palpable pulses b/l  Abdomen: soft, non-tender, non-distended, +BS. incisions c/d/i   Extremities: WWP       Labs:                        12.8   6.14  )-----------( 236      ( 20 Nov 2023 06:10 )             38.2     11-20    137  |  102  |  10  ----------------------------<  100<H>  4.2   |  25  |  1.00    Ca    8.6      20 Nov 2023 06:10  Phos  3.8     11-19  Mg     2.20     11-19    TPro  6.1  /  Alb  3.6  /  TBili  2.0<H>  /  DBili  x   /  AST  82<H>  /  ALT  378<H>  /  AlkPhos  142<H>  11-20    LIVER FUNCTIONS - ( 20 Nov 2023 06:10 )  Alb: 3.6 g/dL / Pro: 6.1 g/dL / ALK PHOS: 142 U/L / ALT: 378 U/L / AST: 82 U/L / GGT: x           PT/INR - ( 19 Nov 2023 05:41 )   PT: 11.3 sec;   INR: 1.01 ratio         PTT - ( 19 Nov 2023 05:41 )  PTT:34.5 sec  Urinalysis Basic - ( 20 Nov 2023 06:10 )    Color: x / Appearance: x / SG: x / pH: x  Gluc: 100 mg/dL / Ketone: x  / Bili: x / Urobili: x   Blood: x / Protein: x / Nitrite: x   Leuk Esterase: x / RBC: x / WBC x   Sq Epi: x / Non Sq Epi: x / Bacteria: x

## 2023-11-20 NOTE — DISCHARGE NOTE PROVIDER - HOSPITAL COURSE
On 11/18, 38M w/ no significant PMHx or PSHx of presented to the ED w/ 1 week of epigastric abdominal discomfort and reflux. Patient states was having N/V with PO intolerance earlier this week however improving. Patient also notes lighter stools, dark urine, and yellow of the eyes over the last 2 days. Patient saw outpatient GI a few days ago and labs came back with elevated bilirubin and LFTs prompting visit to the ED. In the ED, AVSS. Labs show no leukocytosis. T bili of 7.4, AST//755, lipase >3000. CT A/P shows c/f acute cholecystitis and gallstone pancreatitis with CBD 8mm. RUQ c/f acute cholecystitis, measuring CBD 5mm. Patient was admitted to surgery, made NPO/IVF, started on zosyn, and GI consulted for possible ERCP. GI rec trending LFTs/Tbili and MRCP iso improved clinical symptoms with possible passage of stones    On 11/19 patient continued to improve clinically and am labs showed significant reduction in Tbili 7.4>2.8 with normal WBCs. Patient was added on and subsequently underwent lap choly with planned IOC, however, there was difficulty passing catheter so IOC aborted. Patient tolerated uncomplicated cholecystectomy and recovered well on floors. He had good pain control and was tolerating CLD without issue.     On 11/20, am labs showed LFTs, Tbili, and ALP downtrending, and patient was having good pain control. At the time of discharge, the patient was hemodynamically stable, was tolerating PO diet, was voiding urine and passing stool.  He was ambulating and was comfortable with adequate pain control.  The patient was instructed to follow up with Dr. Del Cid within 1-2 weeks after discharge from the hospital.  The patient / family felt comfortable with discharge.  The patient had no other issues.    Per attending, patient deemed medically stable and ready for discharge at this time. On 11/18, 38M w/ no significant PMHx or PSHx of presented to the ED w/ 1 week of epigastric abdominal discomfort and reflux. Patient states was having N/V with PO intolerance earlier this week however improving. Patient also notes lighter stools, dark urine, and yellow of the eyes over the last 2 days. Patient saw outpatient GI a few days ago and labs came back with elevated bilirubin and LFTs prompting visit to the ED. In the ED, AVSS. Labs show no leukocytosis. T bili of 7.4, AST//755, lipase >3000. CT A/P shows c/f acute cholecystitis and gallstone pancreatitis with CBD 8mm. RUQ c/f acute cholecystitis, measuring CBD 5mm. Patient was admitted to surgery, made NPO/IVF, started on zosyn, and GI consulted for possible ERCP. GI rec trending LFTs/Tbili and MRCP iso improved clinical symptoms with possible passage of stones    On 11/19 patient continued to improve clinically and am labs showed significant reduction in Tbili 7.4>2.8 with normal WBCs. Patient was added on and subsequently underwent lap choly with planned IOC, however, there was difficulty passing catheter so IOC aborted. Patient tolerated uncomplicated cholecystectomy and recovered well on floors. He had good pain control and was tolerating CLD without issue.     On 11/20, am labs showed LFTs, Tbili, and ALP downtrending, and patient was having good pain control. Patient underwent EUS with GI on 11/22 where they visualized gastritis (biopsied), duodenitis, and no sign of significant pathology in the entire pancreas. They recommended 4 weeks of PPI and no need to follow-up OP with them.      At the time of discharge, the patient was hemodynamically stable, was tolerating PO diet, was voiding urine and passing stool.  He was ambulating and was comfortable with adequate pain control.  The patient was instructed to follow up with Dr. Del Cid within 1-2 weeks after discharge from the hospital.  The patient / family felt comfortable with discharge.  The patient had no other issues.    Per attending, patient deemed medically stable and ready for discharge at this time.

## 2023-11-20 NOTE — PROGRESS NOTE ADULT - SUBJECTIVE AND OBJECTIVE BOX
Interval Events:   -Had cholecystectomy yesterday.. Feeling well post procedure, passing gas. No N/V.    Hospital Medications:  acetaminophen     Tablet .. 975 milliGRAM(s) Oral every 6 hours  enoxaparin Injectable 40 milliGRAM(s) SubCutaneous every 24 hours  oxyCODONE    IR 5 milliGRAM(s) Oral every 6 hours PRN  oxyCODONE    IR 10 milliGRAM(s) Oral every 6 hours PRN      ROS: All system reviewed and negative except as mentioned above.  HYDROmorphone  Injectable: 0.5 milliGRAM(s) IV Push (11-19-23 @ 17:48)  enoxaparin Injectable: 40 milliGRAM(s) SubCutaneous (11-19-23 @ 21:56)  piperacillin/tazobactam IVPB..: 25 mL/Hr IV Intermittent (11-19-23 @ 21:56)  HYDROmorphone  Injectable: 0.5 milliGRAM(s) IV Push (11-19-23 @ 22:39)  HYDROmorphone  Injectable: 0.5 milliGRAM(s) IV Push (11-20-23 @ 04:29)  piperacillin/tazobactam IVPB..: 25 mL/Hr IV Intermittent (11-20-23 @ 05:09)  acetaminophen   IVPB ..: 400 mL/Hr IV Intermittent (11-20-23 @ 07:17)        Diet, Regular (11-20-23 @ 09:35)  Diet, Clear Liquid (11-19-23 @ 17:30)  Diet, Clear Liquid (11-19-23 @ 17:12)        11-19-23 @ 07:01  -  11-20-23 @ 07:00  --------------------------------------------------------  IN: 1000 mL / OUT: 0 mL / NET: 1000 mL          PHYSICAL EXAM:   Vital Signs:  Vital Signs Last 24 Hrs  T(C): 36.6 (20 Nov 2023 09:47), Max: 37.1 (19 Nov 2023 17:05)  T(F): 97.9 (20 Nov 2023 09:47), Max: 98.8 (19 Nov 2023 17:05)  HR: 65 (20 Nov 2023 09:47) (56 - 79)  BP: 118/65 (20 Nov 2023 09:47) (110/64 - 145/84)  BP(mean): 96 (19 Nov 2023 18:15) (78 - 96)  RR: 18 (20 Nov 2023 09:47) (13 - 20)  SpO2: 100% (20 Nov 2023 09:47) (95% - 100%)    Parameters below as of 20 Nov 2023 09:47  Patient On (Oxygen Delivery Method): room air      Daily     Daily   GENERAL:  NAD, Appears stated age  HEENT:  NC/AT,  conjunctivae clear and pink, sclera -anicteric  CHEST:  Normal Effort, no signs of resp distress  HEART:  RRR, HD stable  ABDOMEN: Srugical scars appear to be healing well. mildly tender around surgical sites  EXTREMITIES:  no cyanosis or edema  SKIN:  Warm & Dry. No rash or erythema  NEURO:  Alert, oriented, no focal deficit  LABS:                        12.8   6.14  )-----------( 236      ( 20 Nov 2023 06:10 )             38.2     Last Hb:Hemoglobin: 12.8 g/dL (11-20-23 @ 06:10)  Hemoglobin: 12.7 g/dL (11-19-23 @ 05:41)  Hemoglobin: 15.1 g/dL (11-18-23 @ 01:12)               137   |  102   |  10                 Ca: 8.6    BMP:   ----------------------------< 100    Mg: x     (11-20-23 @ 06:10)             4.2    |  25    | 1.00               Ph: x        LFT:     TPro: 6.1 / Alb: 3.6 / TBili: 2.0 / DBili: x / AST: 82 / ALT: 378 / AlkPhos: 142   (11-20-23 @ 06:10)    Creatinine: 1.00 mg/dL  Creatinine: 0.97 mg/dL  Creatinine: 0.98 mg/dL      LIVER FUNCTIONS - ( 20 Nov 2023 06:10 )  Alb: 3.6 g/dL / Pro: 6.1 g/dL / ALK PHOS: 142 U/L / ALT: 378 U/L / AST: 82 U/L / GGT: x           PT/INR - ( 19 Nov 2023 05:41 )   PT: 11.3 sec;   INR: 1.01 ratio         PTT - ( 19 Nov 2023 05:41 )  PTT:34.5 sec  Urinalysis Basic - ( 20 Nov 2023 06:10 )    Color: x / Appearance: x / SG: x / pH: x  Gluc: 100 mg/dL / Ketone: x  / Bili: x / Urobili: x   Blood: x / Protein: x / Nitrite: x   Leuk Esterase: x / RBC: x / WBC x   Sq Epi: x / Non Sq Epi: x / Bacteria: x              Imaging: Images reviewed.

## 2023-11-21 LAB
ALBUMIN SERPL ELPH-MCNC: 3.8 G/DL — SIGNIFICANT CHANGE UP (ref 3.3–5)
ALBUMIN SERPL ELPH-MCNC: 3.8 G/DL — SIGNIFICANT CHANGE UP (ref 3.3–5)
ALBUMIN SERPL ELPH-MCNC: 4.3 G/DL — SIGNIFICANT CHANGE UP (ref 3.3–5)
ALBUMIN SERPL ELPH-MCNC: 4.3 G/DL — SIGNIFICANT CHANGE UP (ref 3.3–5)
ALP SERPL-CCNC: 138 U/L — HIGH (ref 40–120)
ALP SERPL-CCNC: 138 U/L — HIGH (ref 40–120)
ALP SERPL-CCNC: 155 U/L — HIGH (ref 40–120)
ALP SERPL-CCNC: 155 U/L — HIGH (ref 40–120)
ALT FLD-CCNC: 527 U/L — HIGH (ref 4–41)
ALT FLD-CCNC: 527 U/L — HIGH (ref 4–41)
ALT FLD-CCNC: 574 U/L — HIGH (ref 4–41)
ALT FLD-CCNC: 574 U/L — HIGH (ref 4–41)
ANION GAP SERPL CALC-SCNC: 9 MMOL/L — SIGNIFICANT CHANGE UP (ref 7–14)
ANION GAP SERPL CALC-SCNC: 9 MMOL/L — SIGNIFICANT CHANGE UP (ref 7–14)
AST SERPL-CCNC: 197 U/L — HIGH (ref 4–40)
AST SERPL-CCNC: 197 U/L — HIGH (ref 4–40)
AST SERPL-CCNC: 217 U/L — HIGH (ref 4–40)
AST SERPL-CCNC: 217 U/L — HIGH (ref 4–40)
BASOPHILS # BLD AUTO: 0.03 K/UL — SIGNIFICANT CHANGE UP (ref 0–0.2)
BASOPHILS # BLD AUTO: 0.03 K/UL — SIGNIFICANT CHANGE UP (ref 0–0.2)
BASOPHILS NFR BLD AUTO: 0.6 % — SIGNIFICANT CHANGE UP (ref 0–2)
BASOPHILS NFR BLD AUTO: 0.6 % — SIGNIFICANT CHANGE UP (ref 0–2)
BILIRUB DIRECT SERPL-MCNC: 0.8 MG/DL — HIGH (ref 0–0.3)
BILIRUB DIRECT SERPL-MCNC: 0.8 MG/DL — HIGH (ref 0–0.3)
BILIRUB INDIRECT FLD-MCNC: 0.9 MG/DL — SIGNIFICANT CHANGE UP (ref 0–1)
BILIRUB INDIRECT FLD-MCNC: 0.9 MG/DL — SIGNIFICANT CHANGE UP (ref 0–1)
BILIRUB SERPL-MCNC: 1.7 MG/DL — HIGH (ref 0.2–1.2)
BUN SERPL-MCNC: 9 MG/DL — SIGNIFICANT CHANGE UP (ref 7–23)
BUN SERPL-MCNC: 9 MG/DL — SIGNIFICANT CHANGE UP (ref 7–23)
CALCIUM SERPL-MCNC: 8.6 MG/DL — SIGNIFICANT CHANGE UP (ref 8.4–10.5)
CALCIUM SERPL-MCNC: 8.6 MG/DL — SIGNIFICANT CHANGE UP (ref 8.4–10.5)
CHLORIDE SERPL-SCNC: 105 MMOL/L — SIGNIFICANT CHANGE UP (ref 98–107)
CHLORIDE SERPL-SCNC: 105 MMOL/L — SIGNIFICANT CHANGE UP (ref 98–107)
CO2 SERPL-SCNC: 25 MMOL/L — SIGNIFICANT CHANGE UP (ref 22–31)
CO2 SERPL-SCNC: 25 MMOL/L — SIGNIFICANT CHANGE UP (ref 22–31)
CREAT SERPL-MCNC: 0.9 MG/DL — SIGNIFICANT CHANGE UP (ref 0.5–1.3)
CREAT SERPL-MCNC: 0.9 MG/DL — SIGNIFICANT CHANGE UP (ref 0.5–1.3)
EGFR: 111 ML/MIN/1.73M2 — SIGNIFICANT CHANGE UP
EGFR: 111 ML/MIN/1.73M2 — SIGNIFICANT CHANGE UP
EOSINOPHIL # BLD AUTO: 0.1 K/UL — SIGNIFICANT CHANGE UP (ref 0–0.5)
EOSINOPHIL # BLD AUTO: 0.1 K/UL — SIGNIFICANT CHANGE UP (ref 0–0.5)
EOSINOPHIL NFR BLD AUTO: 1.9 % — SIGNIFICANT CHANGE UP (ref 0–6)
EOSINOPHIL NFR BLD AUTO: 1.9 % — SIGNIFICANT CHANGE UP (ref 0–6)
GLUCOSE SERPL-MCNC: 98 MG/DL — SIGNIFICANT CHANGE UP (ref 70–99)
GLUCOSE SERPL-MCNC: 98 MG/DL — SIGNIFICANT CHANGE UP (ref 70–99)
HCT VFR BLD CALC: 39.6 % — SIGNIFICANT CHANGE UP (ref 39–50)
HCT VFR BLD CALC: 39.6 % — SIGNIFICANT CHANGE UP (ref 39–50)
HGB BLD-MCNC: 13.3 G/DL — SIGNIFICANT CHANGE UP (ref 13–17)
HGB BLD-MCNC: 13.3 G/DL — SIGNIFICANT CHANGE UP (ref 13–17)
IANC: 3.36 K/UL — SIGNIFICANT CHANGE UP (ref 1.8–7.4)
IANC: 3.36 K/UL — SIGNIFICANT CHANGE UP (ref 1.8–7.4)
IMM GRANULOCYTES NFR BLD AUTO: 0.9 % — SIGNIFICANT CHANGE UP (ref 0–0.9)
IMM GRANULOCYTES NFR BLD AUTO: 0.9 % — SIGNIFICANT CHANGE UP (ref 0–0.9)
LYMPHOCYTES # BLD AUTO: 1.34 K/UL — SIGNIFICANT CHANGE UP (ref 1–3.3)
LYMPHOCYTES # BLD AUTO: 1.34 K/UL — SIGNIFICANT CHANGE UP (ref 1–3.3)
LYMPHOCYTES # BLD AUTO: 25.4 % — SIGNIFICANT CHANGE UP (ref 13–44)
LYMPHOCYTES # BLD AUTO: 25.4 % — SIGNIFICANT CHANGE UP (ref 13–44)
MAGNESIUM SERPL-MCNC: 2.3 MG/DL — SIGNIFICANT CHANGE UP (ref 1.6–2.6)
MAGNESIUM SERPL-MCNC: 2.3 MG/DL — SIGNIFICANT CHANGE UP (ref 1.6–2.6)
MCHC RBC-ENTMCNC: 28 PG — SIGNIFICANT CHANGE UP (ref 27–34)
MCHC RBC-ENTMCNC: 28 PG — SIGNIFICANT CHANGE UP (ref 27–34)
MCHC RBC-ENTMCNC: 33.6 GM/DL — SIGNIFICANT CHANGE UP (ref 32–36)
MCHC RBC-ENTMCNC: 33.6 GM/DL — SIGNIFICANT CHANGE UP (ref 32–36)
MCV RBC AUTO: 83.4 FL — SIGNIFICANT CHANGE UP (ref 80–100)
MCV RBC AUTO: 83.4 FL — SIGNIFICANT CHANGE UP (ref 80–100)
MONOCYTES # BLD AUTO: 0.39 K/UL — SIGNIFICANT CHANGE UP (ref 0–0.9)
MONOCYTES # BLD AUTO: 0.39 K/UL — SIGNIFICANT CHANGE UP (ref 0–0.9)
MONOCYTES NFR BLD AUTO: 7.4 % — SIGNIFICANT CHANGE UP (ref 2–14)
MONOCYTES NFR BLD AUTO: 7.4 % — SIGNIFICANT CHANGE UP (ref 2–14)
NEUTROPHILS # BLD AUTO: 3.36 K/UL — SIGNIFICANT CHANGE UP (ref 1.8–7.4)
NEUTROPHILS # BLD AUTO: 3.36 K/UL — SIGNIFICANT CHANGE UP (ref 1.8–7.4)
NEUTROPHILS NFR BLD AUTO: 63.8 % — SIGNIFICANT CHANGE UP (ref 43–77)
NEUTROPHILS NFR BLD AUTO: 63.8 % — SIGNIFICANT CHANGE UP (ref 43–77)
NRBC # BLD: 0 /100 WBCS — SIGNIFICANT CHANGE UP (ref 0–0)
NRBC # BLD: 0 /100 WBCS — SIGNIFICANT CHANGE UP (ref 0–0)
NRBC # FLD: 0 K/UL — SIGNIFICANT CHANGE UP (ref 0–0)
NRBC # FLD: 0 K/UL — SIGNIFICANT CHANGE UP (ref 0–0)
PHOSPHATE SERPL-MCNC: 3.1 MG/DL — SIGNIFICANT CHANGE UP (ref 2.5–4.5)
PHOSPHATE SERPL-MCNC: 3.1 MG/DL — SIGNIFICANT CHANGE UP (ref 2.5–4.5)
PLATELET # BLD AUTO: 229 K/UL — SIGNIFICANT CHANGE UP (ref 150–400)
PLATELET # BLD AUTO: 229 K/UL — SIGNIFICANT CHANGE UP (ref 150–400)
POTASSIUM SERPL-MCNC: 3.8 MMOL/L — SIGNIFICANT CHANGE UP (ref 3.5–5.3)
POTASSIUM SERPL-MCNC: 3.8 MMOL/L — SIGNIFICANT CHANGE UP (ref 3.5–5.3)
POTASSIUM SERPL-SCNC: 3.8 MMOL/L — SIGNIFICANT CHANGE UP (ref 3.5–5.3)
POTASSIUM SERPL-SCNC: 3.8 MMOL/L — SIGNIFICANT CHANGE UP (ref 3.5–5.3)
PROT SERPL-MCNC: 6.3 G/DL — SIGNIFICANT CHANGE UP (ref 6–8.3)
PROT SERPL-MCNC: 6.3 G/DL — SIGNIFICANT CHANGE UP (ref 6–8.3)
PROT SERPL-MCNC: 7.2 G/DL — SIGNIFICANT CHANGE UP (ref 6–8.3)
PROT SERPL-MCNC: 7.2 G/DL — SIGNIFICANT CHANGE UP (ref 6–8.3)
RBC # BLD: 4.75 M/UL — SIGNIFICANT CHANGE UP (ref 4.2–5.8)
RBC # BLD: 4.75 M/UL — SIGNIFICANT CHANGE UP (ref 4.2–5.8)
RBC # FLD: 12.9 % — SIGNIFICANT CHANGE UP (ref 10.3–14.5)
RBC # FLD: 12.9 % — SIGNIFICANT CHANGE UP (ref 10.3–14.5)
SODIUM SERPL-SCNC: 139 MMOL/L — SIGNIFICANT CHANGE UP (ref 135–145)
SODIUM SERPL-SCNC: 139 MMOL/L — SIGNIFICANT CHANGE UP (ref 135–145)
WBC # BLD: 5.27 K/UL — SIGNIFICANT CHANGE UP (ref 3.8–10.5)
WBC # BLD: 5.27 K/UL — SIGNIFICANT CHANGE UP (ref 3.8–10.5)
WBC # FLD AUTO: 5.27 K/UL — SIGNIFICANT CHANGE UP (ref 3.8–10.5)
WBC # FLD AUTO: 5.27 K/UL — SIGNIFICANT CHANGE UP (ref 3.8–10.5)

## 2023-11-21 RX ADMIN — Medication 975 MILLIGRAM(S): at 05:23

## 2023-11-21 RX ADMIN — Medication 600 MILLIGRAM(S): at 17:56

## 2023-11-21 RX ADMIN — ENOXAPARIN SODIUM 40 MILLIGRAM(S): 100 INJECTION SUBCUTANEOUS at 21:20

## 2023-11-21 RX ADMIN — Medication 600 MILLIGRAM(S): at 12:26

## 2023-11-21 RX ADMIN — Medication 975 MILLIGRAM(S): at 18:26

## 2023-11-21 RX ADMIN — Medication 600 MILLIGRAM(S): at 12:56

## 2023-11-21 RX ADMIN — Medication 600 MILLIGRAM(S): at 05:22

## 2023-11-21 RX ADMIN — Medication 975 MILLIGRAM(S): at 12:26

## 2023-11-21 RX ADMIN — Medication 975 MILLIGRAM(S): at 23:57

## 2023-11-21 RX ADMIN — Medication 975 MILLIGRAM(S): at 17:56

## 2023-11-21 RX ADMIN — Medication 600 MILLIGRAM(S): at 23:05

## 2023-11-21 RX ADMIN — Medication 975 MILLIGRAM(S): at 23:05

## 2023-11-21 RX ADMIN — Medication 600 MILLIGRAM(S): at 18:26

## 2023-11-21 RX ADMIN — Medication 600 MILLIGRAM(S): at 23:57

## 2023-11-21 RX ADMIN — Medication 975 MILLIGRAM(S): at 12:56

## 2023-11-21 NOTE — PROGRESS NOTE ADULT - ATTENDING COMMENTS
I have personally interviewed and examined this patient, reviewed pertinent labs and imaging, and discussed the case with colleagues, residents, and physician assistants on B Team rounds.  More than 50% of this 30 minute encounter including face to face with the patient was spent counseling and/or coordination of care on gallstone pancreatitis.  Time included review of vitals, labs, imaging, discussion with consultants and coordination with the operating room/emergency department.    37 yo M s/p lap nikolas on 11/19 for gallstone pancreatitis. IOC unable to be safely performed. Bili 2 this AM. Because IOC was not performed, would recommend trending bili to normalize vs obtain MRCP prior to discharge.     - trend bili   - obtain MRCP if bili does not normalize   - multimodal pain control  - regular diet     The active care issues are:  1. gallstone pancreatitis     The Acute Care Surgery (B Team) Attending Group Practice:  Dr. Katherine Bentley    urgent issues - spectra [id number]  nonurgent issues - [phone]  patient appointments or afterhours - [phone]
Patient with gallstone pancreatitis, likely passed stone, t bili downtrended today  plan  lap nikolas with ioc    I have personally interviewed and examined this patient, reviewed pertinent labs and imaging, and discussed the case with colleagues, residents, and physician assistants on B Team rounds.    The active care issues are:  1. gallstone pancreatitis    The Acute Care Surgery (B Team) Attending Group Practice:  Dr. Bettie Michel, Dr. Gus Rogel, Dr. Tree Del Cid, Dr. Paul Kapadia,     urgent issues - spectra [id number]  nonurgent issues - [phone]  patient appointments or afterhours - [phone]
I have personally interviewed and examined this patient, reviewed pertinent labs and imaging, and discussed the case with colleagues, residents, and physician assistants on B Team rounds.  More than 50% of this 30 minute encounter including face to face with the patient was spent counseling and/or coordination of care on gallstone pancreatitis.  Time included review of vitals, labs, imaging, discussion with consultants and coordination with the operating room/emergency department.    39 yo M s/p lap nikolas on 11/19 for gallstone pancreatitis. IOC unable to be safely performed. Bili gradually downtrending but not normalized.     - trend bili   - obtain MRCP to r/o choledocholithiasis    - multimodal pain control  - regular diet     The active care issues are:  1. gallstone pancreatitis     The Acute Care Surgery (B Team) Attending Group Practice:  Dr. Katherine Bentley    urgent issues - spectra [phone]  nonurgent issues - [phone]  patient appointments or afterhours - [phone] .

## 2023-11-21 NOTE — PROGRESS NOTE ADULT - SUBJECTIVE AND OBJECTIVE BOX
B TEAM SURGERY DAILY PROGRESS NOTE    STATUS POST:      POST OPERATIVE DAY #:     Vital Signs Last 24 Hrs  T(C): 36.3 (21 Nov 2023 06:03), Max: 36.8 (20 Nov 2023 22:10)  T(F): 97.4 (21 Nov 2023 06:03), Max: 98.3 (20 Nov 2023 22:10)  HR: 63 (21 Nov 2023 06:03) (61 - 74)  BP: 103/71 (21 Nov 2023 06:03) (103/71 - 118/65)  BP(mean): --  RR: 17 (21 Nov 2023 06:03) (16 - 18)  SpO2: 97% (21 Nov 2023 06:03) (95% - 100%)    Parameters below as of 21 Nov 2023 06:03  Patient On (Oxygen Delivery Method): room air          SUBJECTIVE: Pt seen    Pain: [ ] YES [ ] NO  Pain (0-10):              Pain Control Adequate: [ ] YES [ ] NO  SOB: [ ]YES [ ] NO  Chest Discomfort: [ ] YES [ ] NO    Nausea: [ ] YES [ ] NO           Vomiting: [ ] YES [ ] NO  Flatus: [ ] YES [ ] NO             Bowel Movement: [ ] YES [ ] NO     Void: [ ]YES [ ]No    Stout:  NGT:  MARTÍN:    General Appearance: Appears well, NAD  Neck: Supple  Chest: Equal expansion bilaterally, equal breath sounds  CV: Pulse regular presently  Abdomen: Soft, nontense, appropriate incisional tenderness, dressings clean and dry and intact  Extremities: Grossly symmetric, SCD's in place     I&O's Summary    20 Nov 2023 07:01  -  21 Nov 2023 07:00  --------------------------------------------------------  IN: 1050 mL / OUT: 1900 mL / NET: -850 mL      I&O's Detail    20 Nov 2023 07:01  -  21 Nov 2023 07:00  --------------------------------------------------------  IN:    Lactated Ringers: 700 mL    Oral Fluid: 350 mL  Total IN: 1050 mL    OUT:    Voided (mL): 1900 mL  Total OUT: 1900 mL    Total NET: -850 mL          MEDICATIONS  (STANDING):  acetaminophen     Tablet .. 975 milliGRAM(s) Oral every 6 hours  enoxaparin Injectable 40 milliGRAM(s) SubCutaneous every 24 hours  ibuprofen  Tablet. 600 milliGRAM(s) Oral every 6 hours    MEDICATIONS  (PRN):  oxyCODONE    IR 5 milliGRAM(s) Oral every 6 hours PRN Moderate Pain (4 - 6)  oxyCODONE    IR 10 milliGRAM(s) Oral every 6 hours PRN Severe Pain (7 - 10)      LABS:                        13.3   5.27  )-----------( 229      ( 21 Nov 2023 05:55 )             39.6     11-21    139  |  105  |  9   ----------------------------<  98  3.8   |  25  |  0.90    Ca    8.6      21 Nov 2023 05:55  Phos  3.1     11-21  Mg     2.30     11-21    TPro  6.3  /  Alb  3.8  /  TBili  1.7<H>  /  DBili  x   /  AST  217<H>  /  ALT  527<H>  /  AlkPhos  138<H>  11-21      Urinalysis Basic - ( 21 Nov 2023 05:55 )    Color: x / Appearance: x / SG: x / pH: x  Gluc: 98 mg/dL / Ketone: x  / Bili: x / Urobili: x   Blood: x / Protein: x / Nitrite: x   Leuk Esterase: x / RBC: x / WBC x   Sq Epi: x / Non Sq Epi: x / Bacteria: x        RADIOLOGY & ADDITIONAL STUDIES: B TEAM SURGERY DAILY PROGRESS NOTE    STATUS POST:  2    POST OPERATIVE DAY #: morenita connor 11/19    Vital Signs Last 24 Hrs  T(C): 36.3 (21 Nov 2023 06:03), Max: 36.8 (20 Nov 2023 22:10)  T(F): 97.4 (21 Nov 2023 06:03), Max: 98.3 (20 Nov 2023 22:10)  HR: 63 (21 Nov 2023 06:03) (61 - 74)  BP: 103/71 (21 Nov 2023 06:03) (103/71 - 118/65)  BP(mean): --  RR: 17 (21 Nov 2023 06:03) (16 - 18)  SpO2: 97% (21 Nov 2023 06:03) (95% - 100%)    Parameters below as of 21 Nov 2023 06:03  Patient On (Oxygen Delivery Method): room air          SUBJECTIVE: Pt seen and examined at bedside. Reports he is tolerating PO, passing flatus. Patient denies subjective fever/chills, CP, SOB, n/v, or abdominal pain.  Pain well controlled.         General Appearance: Appears well, NAD  Neck: Supple  Chest: Equal expansion bilaterally, equal breath sounds  CV: Pulse regular presently  Abdomen: Soft, non distended, non tender. Dressings clean and dry and intact  Extremities: Grossly symmetric, SCD's in place     I&O's Summary    20 Nov 2023 07:01  -  21 Nov 2023 07:00  --------------------------------------------------------  IN: 1050 mL / OUT: 1900 mL / NET: -850 mL      I&O's Detail    20 Nov 2023 07:01  -  21 Nov 2023 07:00  --------------------------------------------------------  IN:    Lactated Ringers: 700 mL    Oral Fluid: 350 mL  Total IN: 1050 mL    OUT:    Voided (mL): 1900 mL  Total OUT: 1900 mL    Total NET: -850 mL          MEDICATIONS  (STANDING):  acetaminophen     Tablet .. 975 milliGRAM(s) Oral every 6 hours  enoxaparin Injectable 40 milliGRAM(s) SubCutaneous every 24 hours  ibuprofen  Tablet. 600 milliGRAM(s) Oral every 6 hours    MEDICATIONS  (PRN):  oxyCODONE    IR 5 milliGRAM(s) Oral every 6 hours PRN Moderate Pain (4 - 6)  oxyCODONE    IR 10 milliGRAM(s) Oral every 6 hours PRN Severe Pain (7 - 10)      LABS:                        13.3   5.27  )-----------( 229      ( 21 Nov 2023 05:55 )             39.6     11-21    139  |  105  |  9   ----------------------------<  98  3.8   |  25  |  0.90    Ca    8.6      21 Nov 2023 05:55  Phos  3.1     11-21  Mg     2.30     11-21    TPro  6.3  /  Alb  3.8  /  TBili  1.7<H>  /  DBili  x   /  AST  217<H>  /  ALT  527<H>  /  AlkPhos  138<H>  11-21      Urinalysis Basic - ( 21 Nov 2023 05:55 )    Color: x / Appearance: x / SG: x / pH: x  Gluc: 98 mg/dL / Ketone: x  / Bili: x / Urobili: x   Blood: x / Protein: x / Nitrite: x   Leuk Esterase: x / RBC: x / WBC x   Sq Epi: x / Non Sq Epi: x / Bacteria: x        RADIOLOGY & ADDITIONAL STUDIES:

## 2023-11-21 NOTE — PROGRESS NOTE ADULT - ASSESSMENT
38M who presented with 1 week of epigastric abdominal discomfort and reflux, found to have a Tbili of 7.4 and lipase >3000.  CT A&P showing acute cholecystitis and gallstone pancreatitis,  now s/p lap choly 11/19. Tbili continues to slowly downtrend suggesting a passed stone.    Plan:  -Continue Regular diet  -Pending MRCP  -Continue to trend LFTs  -Pain regimen with Tylenol and Ibuprofen ATC, Oxy PRN  -OOB/Ambulate  -DVT ppx with Lovenox       B Team Surgery   #[id number]  Please page with any questions or concerns 38M who presented with 1 week of epigastric abdominal discomfort and reflux, found to have a Tbili of 7.4 and lipase >3000.  CT A&P showing acute cholecystitis and gallstone pancreatitis,  now s/p lap nikolas 11/19. Tbili continues to slowly downtrend suggesting a passed stone.    Plan:  -Continue Regular diet  -Pending MRCP  -GI consulted 11/20, follow up recommendations  -Continue to trend LFTs  -Pain regimen with Tylenol and Ibuprofen ATC, Oxy PRN  -OOB/Ambulate  -DVT ppx with Lovenox       B Team Surgery   #[id number]  Please page with any questions or concerns

## 2023-11-22 ENCOUNTER — TRANSCRIPTION ENCOUNTER (OUTPATIENT)
Age: 38
End: 2023-11-22

## 2023-11-22 ENCOUNTER — RESULT REVIEW (OUTPATIENT)
Age: 38
End: 2023-11-22

## 2023-11-22 VITALS
OXYGEN SATURATION: 100 % | RESPIRATION RATE: 18 BRPM | HEART RATE: 60 BPM | SYSTOLIC BLOOD PRESSURE: 115 MMHG | DIASTOLIC BLOOD PRESSURE: 75 MMHG | TEMPERATURE: 98 F

## 2023-11-22 LAB
ALBUMIN SERPL ELPH-MCNC: 3.8 G/DL — SIGNIFICANT CHANGE UP (ref 3.3–5)
ALBUMIN SERPL ELPH-MCNC: 3.8 G/DL — SIGNIFICANT CHANGE UP (ref 3.3–5)
ALP SERPL-CCNC: 141 U/L — HIGH (ref 40–120)
ALP SERPL-CCNC: 141 U/L — HIGH (ref 40–120)
ALT FLD-CCNC: 419 U/L — HIGH (ref 4–41)
ALT FLD-CCNC: 419 U/L — HIGH (ref 4–41)
ANION GAP SERPL CALC-SCNC: 10 MMOL/L — SIGNIFICANT CHANGE UP (ref 7–14)
ANION GAP SERPL CALC-SCNC: 10 MMOL/L — SIGNIFICANT CHANGE UP (ref 7–14)
AST SERPL-CCNC: 121 U/L — HIGH (ref 4–40)
AST SERPL-CCNC: 121 U/L — HIGH (ref 4–40)
BILIRUB DIRECT SERPL-MCNC: 0.7 MG/DL — HIGH (ref 0–0.3)
BILIRUB DIRECT SERPL-MCNC: 0.7 MG/DL — HIGH (ref 0–0.3)
BILIRUB INDIRECT FLD-MCNC: 0.8 MG/DL — SIGNIFICANT CHANGE UP (ref 0–1)
BILIRUB INDIRECT FLD-MCNC: 0.8 MG/DL — SIGNIFICANT CHANGE UP (ref 0–1)
BILIRUB SERPL-MCNC: 1.5 MG/DL — HIGH (ref 0.2–1.2)
BILIRUB SERPL-MCNC: 1.5 MG/DL — HIGH (ref 0.2–1.2)
BUN SERPL-MCNC: 11 MG/DL — SIGNIFICANT CHANGE UP (ref 7–23)
BUN SERPL-MCNC: 11 MG/DL — SIGNIFICANT CHANGE UP (ref 7–23)
CALCIUM SERPL-MCNC: 9 MG/DL — SIGNIFICANT CHANGE UP (ref 8.4–10.5)
CALCIUM SERPL-MCNC: 9 MG/DL — SIGNIFICANT CHANGE UP (ref 8.4–10.5)
CHLORIDE SERPL-SCNC: 107 MMOL/L — SIGNIFICANT CHANGE UP (ref 98–107)
CHLORIDE SERPL-SCNC: 107 MMOL/L — SIGNIFICANT CHANGE UP (ref 98–107)
CO2 SERPL-SCNC: 23 MMOL/L — SIGNIFICANT CHANGE UP (ref 22–31)
CO2 SERPL-SCNC: 23 MMOL/L — SIGNIFICANT CHANGE UP (ref 22–31)
CREAT SERPL-MCNC: 0.89 MG/DL — SIGNIFICANT CHANGE UP (ref 0.5–1.3)
CREAT SERPL-MCNC: 0.89 MG/DL — SIGNIFICANT CHANGE UP (ref 0.5–1.3)
EGFR: 111 ML/MIN/1.73M2 — SIGNIFICANT CHANGE UP
EGFR: 111 ML/MIN/1.73M2 — SIGNIFICANT CHANGE UP
GLUCOSE SERPL-MCNC: 96 MG/DL — SIGNIFICANT CHANGE UP (ref 70–99)
GLUCOSE SERPL-MCNC: 96 MG/DL — SIGNIFICANT CHANGE UP (ref 70–99)
HCT VFR BLD CALC: 39.6 % — SIGNIFICANT CHANGE UP (ref 39–50)
HCT VFR BLD CALC: 39.6 % — SIGNIFICANT CHANGE UP (ref 39–50)
HGB BLD-MCNC: 13.2 G/DL — SIGNIFICANT CHANGE UP (ref 13–17)
HGB BLD-MCNC: 13.2 G/DL — SIGNIFICANT CHANGE UP (ref 13–17)
MAGNESIUM SERPL-MCNC: 2.2 MG/DL — SIGNIFICANT CHANGE UP (ref 1.6–2.6)
MAGNESIUM SERPL-MCNC: 2.2 MG/DL — SIGNIFICANT CHANGE UP (ref 1.6–2.6)
MCHC RBC-ENTMCNC: 27.5 PG — SIGNIFICANT CHANGE UP (ref 27–34)
MCHC RBC-ENTMCNC: 27.5 PG — SIGNIFICANT CHANGE UP (ref 27–34)
MCHC RBC-ENTMCNC: 33.3 GM/DL — SIGNIFICANT CHANGE UP (ref 32–36)
MCHC RBC-ENTMCNC: 33.3 GM/DL — SIGNIFICANT CHANGE UP (ref 32–36)
MCV RBC AUTO: 82.5 FL — SIGNIFICANT CHANGE UP (ref 80–100)
MCV RBC AUTO: 82.5 FL — SIGNIFICANT CHANGE UP (ref 80–100)
NRBC # BLD: 0 /100 WBCS — SIGNIFICANT CHANGE UP (ref 0–0)
NRBC # BLD: 0 /100 WBCS — SIGNIFICANT CHANGE UP (ref 0–0)
NRBC # FLD: 0 K/UL — SIGNIFICANT CHANGE UP (ref 0–0)
NRBC # FLD: 0 K/UL — SIGNIFICANT CHANGE UP (ref 0–0)
PHOSPHATE SERPL-MCNC: 3.5 MG/DL — SIGNIFICANT CHANGE UP (ref 2.5–4.5)
PHOSPHATE SERPL-MCNC: 3.5 MG/DL — SIGNIFICANT CHANGE UP (ref 2.5–4.5)
PLATELET # BLD AUTO: 252 K/UL — SIGNIFICANT CHANGE UP (ref 150–400)
PLATELET # BLD AUTO: 252 K/UL — SIGNIFICANT CHANGE UP (ref 150–400)
POTASSIUM SERPL-MCNC: 3.8 MMOL/L — SIGNIFICANT CHANGE UP (ref 3.5–5.3)
POTASSIUM SERPL-MCNC: 3.8 MMOL/L — SIGNIFICANT CHANGE UP (ref 3.5–5.3)
POTASSIUM SERPL-SCNC: 3.8 MMOL/L — SIGNIFICANT CHANGE UP (ref 3.5–5.3)
POTASSIUM SERPL-SCNC: 3.8 MMOL/L — SIGNIFICANT CHANGE UP (ref 3.5–5.3)
PROT SERPL-MCNC: 6.5 G/DL — SIGNIFICANT CHANGE UP (ref 6–8.3)
PROT SERPL-MCNC: 6.5 G/DL — SIGNIFICANT CHANGE UP (ref 6–8.3)
RBC # BLD: 4.8 M/UL — SIGNIFICANT CHANGE UP (ref 4.2–5.8)
RBC # BLD: 4.8 M/UL — SIGNIFICANT CHANGE UP (ref 4.2–5.8)
RBC # FLD: 12.7 % — SIGNIFICANT CHANGE UP (ref 10.3–14.5)
RBC # FLD: 12.7 % — SIGNIFICANT CHANGE UP (ref 10.3–14.5)
SODIUM SERPL-SCNC: 140 MMOL/L — SIGNIFICANT CHANGE UP (ref 135–145)
SODIUM SERPL-SCNC: 140 MMOL/L — SIGNIFICANT CHANGE UP (ref 135–145)
WBC # BLD: 5.07 K/UL — SIGNIFICANT CHANGE UP (ref 3.8–10.5)
WBC # BLD: 5.07 K/UL — SIGNIFICANT CHANGE UP (ref 3.8–10.5)
WBC # FLD AUTO: 5.07 K/UL — SIGNIFICANT CHANGE UP (ref 3.8–10.5)
WBC # FLD AUTO: 5.07 K/UL — SIGNIFICANT CHANGE UP (ref 3.8–10.5)

## 2023-11-22 PROCEDURE — 43239 EGD BIOPSY SINGLE/MULTIPLE: CPT

## 2023-11-22 PROCEDURE — 88305 TISSUE EXAM BY PATHOLOGIST: CPT | Mod: 26

## 2023-11-22 PROCEDURE — 43237 ENDOSCOPIC US EXAM ESOPH: CPT

## 2023-11-22 RX ORDER — DEXTROSE MONOHYDRATE, SODIUM CHLORIDE, AND POTASSIUM CHLORIDE 50; .745; 4.5 G/1000ML; G/1000ML; G/1000ML
1000 INJECTION, SOLUTION INTRAVENOUS
Refills: 0 | Status: DISCONTINUED | OUTPATIENT
Start: 2023-11-22 | End: 2023-11-22

## 2023-11-22 RX ORDER — ACETAMINOPHEN 500 MG
3 TABLET ORAL
Qty: 0 | Refills: 0 | DISCHARGE
Start: 2023-11-22

## 2023-11-22 RX ORDER — IBUPROFEN 200 MG
1 TABLET ORAL
Qty: 0 | Refills: 0 | DISCHARGE
Start: 2023-11-22

## 2023-11-22 RX ORDER — OMEPRAZOLE 10 MG/1
1 CAPSULE, DELAYED RELEASE ORAL
Qty: 28 | Refills: 0
Start: 2023-11-22 | End: 2023-12-19

## 2023-11-22 RX ADMIN — Medication 975 MILLIGRAM(S): at 05:52

## 2023-11-22 RX ADMIN — Medication 975 MILLIGRAM(S): at 05:22

## 2023-11-22 RX ADMIN — Medication 600 MILLIGRAM(S): at 05:22

## 2023-11-22 RX ADMIN — DEXTROSE MONOHYDRATE, SODIUM CHLORIDE, AND POTASSIUM CHLORIDE 100 MILLILITER(S): 50; .745; 4.5 INJECTION, SOLUTION INTRAVENOUS at 06:12

## 2023-11-22 RX ADMIN — Medication 600 MILLIGRAM(S): at 05:52

## 2023-11-22 NOTE — CHART NOTE - NSCHARTNOTEFT_GEN_A_CORE
GI Brief Note:    Plan for EUS/+/-ERCP today  Keep NPO        Mark Umanzor MD  Gastroenterology/Hepatology Fellow  Contact on-call GI fellow via answering service ([phone]) from 5pm-8am AND on weekends/holidays

## 2023-11-22 NOTE — DISCHARGE NOTE NURSING/CASE MANAGEMENT/SOCIAL WORK - PATIENT PORTAL LINK FT
You can access the FollowMyHealth Patient Portal offered by [facility] by registering at the following website: [email or web address]. By joining [employer]’s FollowMyHealth portal, you will also be able to view your health information using other applications (apps) compatible with our system.

## 2023-11-22 NOTE — PROGRESS NOTE ADULT - SUBJECTIVE AND OBJECTIVE BOX
B TEAM SURGERY DAILY PROGRESS NOTE:     INTERVAL EVENTS: None    SUBJECTIVE/ROS: Patient seen and examined at bedside by surgical team. Denies N/V, sob/chest pain, fever/chills. Reports adequate pain control and passing gas.     OBJECTIVE:  Vital Signs Last 24 Hrs  T(C): 36.7 (22 Nov 2023 05:30), Max: 37 (21 Nov 2023 22:00)  T(F): 98 (22 Nov 2023 05:30), Max: 98.6 (21 Nov 2023 22:00)  HR: 60 (22 Nov 2023 05:30) (56 - 72)  BP: 102/69 (22 Nov 2023 05:30) (102/69 - 121/70)  BP(mean): --  RR: 17 (22 Nov 2023 05:30) (16 - 18)  SpO2: 100% (22 Nov 2023 05:30) (98% - 100%)    Parameters below as of 22 Nov 2023 05:30  Patient On (Oxygen Delivery Method): room air                            13.2   5.07  )-----------( 252      ( 22 Nov 2023 06:00 )             39.6     11-22    140  |  107  |  11  ----------------------------<  96  3.8   |  23  |  0.89    Ca    9.0      22 Nov 2023 06:00  Phos  3.5     11-22  Mg     2.20     11-22    TPro  6.5  /  Alb  3.8  /  TBili  1.5<H>  /  DBili  0.7<H>  /  AST  121<H>  /  ALT  419<H>  /  AlkPhos  141<H>  11-22     I&O's Detail    21 Nov 2023 07:01  -  22 Nov 2023 07:00  --------------------------------------------------------  IN:    dextrose 5% + sodium chloride 0.9% w/ Additives: 200 mL    Oral Fluid: 290 mL  Total IN: 490 mL    OUT:    Blood Loss (mL): 3 mL    IV PiggyBack: 0 mL    Voided (mL): 0 mL  Total OUT: 3 mL    Total NET: 487 mL          IMAGING:      PHYSICAL EXAM:  Constitutional: NAD  Respiratory: non-labored breathing, patent airway  Gastrointestinal: abdomen soft, nontender, nondistended, incisions c/d/i.  Extremities: warm  Neurological: intact

## 2023-11-22 NOTE — PROGRESS NOTE ADULT - ASSESSMENT
38M who presented with 1 week of epigastric abdominal discomfort and reflux, found to have a Tbili of 7.4 and lipase >3000.  CT A&P showing acute cholecystitis and gallstone pancreatitis,  now s/p lap nikolas 11/19. Tbili continues to slowly downtrend suggesting a passed stone.    Plan:  - Pd MRCP  - NPO 4h before MRCP  - IVF while NPO  - Continue Regular diet  - Trending Tbili and LFTs, today Tbili decrease to 1.5  - GI consulted 11/20, follow up recommendations  -Pain regimen with Tylenol and Ibuprofen ATC, Oxy PRN  -OOB/Ambulate  -DVT ppx with Lovenox     B Team Surgery #[id number]

## 2023-11-22 NOTE — DISCHARGE NOTE NURSING/CASE MANAGEMENT/SOCIAL WORK - NSDCPEFALRISK_GEN_ALL_CORE
For information on Fall & Injury Prevention, visit: [email or web address] OR  [email or web address] OR  https://www.cdc.gov/steadi/patient.html

## 2023-11-28 LAB
SURGICAL PATHOLOGY STUDY: SIGNIFICANT CHANGE UP
SURGICAL PATHOLOGY STUDY: SIGNIFICANT CHANGE UP

## 2023-12-04 PROBLEM — Z78.9 OTHER SPECIFIED HEALTH STATUS: Chronic | Status: ACTIVE | Noted: 2023-11-18

## 2023-12-05 LAB
SURGICAL PATHOLOGY STUDY: SIGNIFICANT CHANGE UP
SURGICAL PATHOLOGY STUDY: SIGNIFICANT CHANGE UP

## 2023-12-15 ENCOUNTER — APPOINTMENT (OUTPATIENT)
Dept: TRAUMA SURGERY | Facility: HOSPITAL | Age: 38
End: 2023-12-15

## 2023-12-15 VITALS
TEMPERATURE: 98.1 F | HEART RATE: 104 BPM | WEIGHT: 194 LBS | DIASTOLIC BLOOD PRESSURE: 77 MMHG | HEIGHT: 72 IN | BODY MASS INDEX: 26.28 KG/M2 | SYSTOLIC BLOOD PRESSURE: 114 MMHG

## 2023-12-20 NOTE — PRE-OP CHECKLIST - LATEX ALLERGY
no
no
Alternatives Discussed Intro (Do Not Add Period): I discussed alternative treatments to Mohs surgery and specifically discussed the risks and benefits of topical treatment as well as
Attending Attestation (For Attendings USE Only)...
